# Patient Record
Sex: MALE | Race: WHITE | NOT HISPANIC OR LATINO
[De-identification: names, ages, dates, MRNs, and addresses within clinical notes are randomized per-mention and may not be internally consistent; named-entity substitution may affect disease eponyms.]

---

## 2020-12-28 ENCOUNTER — TRANSCRIPTION ENCOUNTER (OUTPATIENT)
Age: 65
End: 2020-12-28

## 2021-03-16 ENCOUNTER — APPOINTMENT (OUTPATIENT)
Dept: PULMONOLOGY | Facility: CLINIC | Age: 66
End: 2021-03-16
Payer: MEDICARE

## 2021-03-16 VITALS
RESPIRATION RATE: 16 BRPM | BODY MASS INDEX: 22.22 KG/M2 | WEIGHT: 150 LBS | HEART RATE: 76 BPM | SYSTOLIC BLOOD PRESSURE: 152 MMHG | TEMPERATURE: 98.5 F | HEIGHT: 69 IN | DIASTOLIC BLOOD PRESSURE: 84 MMHG

## 2021-03-16 DIAGNOSIS — Z80.0 FAMILY HISTORY OF MALIGNANT NEOPLASM OF DIGESTIVE ORGANS: ICD-10-CM

## 2021-03-16 DIAGNOSIS — Z82.49 FAMILY HISTORY OF ISCHEMIC HEART DISEASE AND OTHER DISEASES OF THE CIRCULATORY SYSTEM: ICD-10-CM

## 2021-03-16 DIAGNOSIS — Z86.59 PERSONAL HISTORY OF OTHER MENTAL AND BEHAVIORAL DISORDERS: ICD-10-CM

## 2021-03-16 DIAGNOSIS — F17.200 NICOTINE DEPENDENCE, UNSPECIFIED, UNCOMPLICATED: ICD-10-CM

## 2021-03-16 DIAGNOSIS — R01.1 CARDIAC MURMUR, UNSPECIFIED: ICD-10-CM

## 2021-03-16 DIAGNOSIS — F41.9 ANXIETY DISORDER, UNSPECIFIED: ICD-10-CM

## 2021-03-16 PROCEDURE — 99204 OFFICE O/P NEW MOD 45 MIN: CPT

## 2021-03-16 RX ORDER — GABAPENTIN 300 MG/1
300 CAPSULE ORAL
Refills: 0 | Status: ACTIVE | COMMUNITY
Start: 2021-03-16

## 2021-03-16 RX ORDER — OMEPRAZOLE 40 MG/1
40 CAPSULE, DELAYED RELEASE ORAL
Refills: 0 | Status: ACTIVE | COMMUNITY
Start: 2021-03-16

## 2021-03-16 NOTE — REVIEW OF SYSTEMS
[Cough] : cough [Sputum] : sputum [Wheezing] : wheezing [SOB on Exertion] : sob on exertion [Negative] : Allergy/Immunology [Nasal Congestion] : nasal congestion [Postnasal Drip] : postnasal drip [Dysphagia] : dysphagia [Arthralgias] : arthralgias [Back Pain] : back pain [Anxiety] : anxiety

## 2021-03-16 NOTE — REASON FOR VISIT
[Initial] : an initial visit [Abnormal CXR/ Chest CT] : an abnormal CXR/ chest CT [Emphysema] : emphysema [Bronchiectasis] : bronchiectasis

## 2021-03-19 LAB — BACTERIA SPT CULT: NORMAL

## 2021-03-26 ENCOUNTER — NON-APPOINTMENT (OUTPATIENT)
Age: 66
End: 2021-03-26

## 2021-03-26 LAB — BACTERIA SPT CULT: ABNORMAL

## 2021-03-29 LAB — BACTERIA SPT CULT: ABNORMAL

## 2021-03-30 ENCOUNTER — TRANSCRIPTION ENCOUNTER (OUTPATIENT)
Age: 66
End: 2021-03-30

## 2021-05-17 LAB
ACID FAST STN SPT: NORMAL

## 2021-06-24 ENCOUNTER — APPOINTMENT (OUTPATIENT)
Dept: PULMONOLOGY | Facility: CLINIC | Age: 66
End: 2021-06-24
Payer: MEDICARE

## 2021-06-24 VITALS
TEMPERATURE: 97.8 F | WEIGHT: 146 LBS | HEART RATE: 79 BPM | HEIGHT: 69 IN | SYSTOLIC BLOOD PRESSURE: 133 MMHG | DIASTOLIC BLOOD PRESSURE: 86 MMHG | RESPIRATION RATE: 16 BRPM | BODY MASS INDEX: 21.62 KG/M2

## 2021-06-24 DIAGNOSIS — Z86.19 PERSONAL HISTORY OF OTHER INFECTIOUS AND PARASITIC DISEASES: ICD-10-CM

## 2021-06-24 PROCEDURE — 99214 OFFICE O/P EST MOD 30 MIN: CPT

## 2021-06-24 NOTE — PHYSICAL EXAM
[No Acute Distress] : no acute distress [Normal Appearance] : normal appearance [Normal Rate/Rhythm] : normal rate/rhythm [No Resp Distress] : no resp distress [Clear to Auscultation Bilaterally] : clear to auscultation bilaterally [No Clubbing] : no clubbing [No Edema] : no edema [No Focal Deficits] : no focal deficits [Oriented x3] : oriented x3 [Normal Affect] : normal affect [TextBox_54] : 3/6 regurgitant murmur left sternal border; 1/6 systolic ejection murmur aortic area

## 2021-06-24 NOTE — REVIEW OF SYSTEMS
[Fatigue] : fatigue [Poor Appetite] : poor appetite [Nasal Congestion] : nasal congestion [Postnasal Drip] : postnasal drip [Cough] : cough [Chest Tightness] : chest tightness [Sputum] : sputum [Dyspnea] : dyspnea [SOB on Exertion] : sob on exertion [Negative] : Cardiovascular [Dysphagia] : dysphagia [Hepatic Disease] : hepatic disease [Chronic Pain] : chronic pain

## 2021-06-24 NOTE — HISTORY OF PRESENT ILLNESS
[TextBox_4] : 65 year old male active smoker with shortness of breath and known emphysema . 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 The patient was prescribed triple therapy but stopped almost immediately because of intolerance. The patient has significant asbestos exposure (he is an ) and is a current smoker with a history of greater than 30 pack years. He has a daily productive cough but denies hemoptysis\par He feels short of breath walking very short distances from his kitchen to the den.  He also feels very  fatigued when he is experiencing the dyspnea.  He finds that he wheezes while lying down as well.  \par He also admits to significant postnasal drip and dysphagia. Occasionally when he coughs, there is food particles in his sputum. He has difficulties with rice and nuts. He reports that he had a swallowing study a number of years ago and was told it was normal. He also has undergone endoscopy as well as colonoscopy. He also reports anxiety

## 2021-06-24 NOTE — PHYSICAL EXAM
[No Acute Distress] : no acute distress [Murmur ___ / 6] : murmur [unfilled] / 6 [Normal S1, S2] : normal s1, s2 [No Resp Distress] : no resp distress [Clear to Auscultation Bilaterally] : clear to auscultation bilaterally [No Focal Deficits] : no focal deficits [Oriented x3] : oriented x3 [Normal Affect] : normal affect [TextBox_54] : 3/6 systolic regurgitant murmur left sternal border to apex; 2/6 systolic ejection murmur aortic area

## 2021-06-24 NOTE — ASSESSMENT
[FreeTextEntry1] : 65 year old male active smoker with significant asbestos exposure (he is an ) with multiple nodules and RUL fissural band-like consolidation, cultures were positive for MAC (not currently on meds was not able to tolerate, emphysema with dyspnea on exertion and cough.\par . The patient had lung function studies from his previous pulmonary physician which demonstrated moderately severe obstructive airways disease with a concomitant diffusion defect. A PET scan done in the last 24 hours showed a decrease in size in the right lower lobe spiculated nodule and a new right upper lobe subcentimeter nodule with increased hypermetabolism, both suggestive of an inflammatory process. There are tubular densities particularly in the right upper lobe with calcification within the densities suggesting the possibility of a plastic bronchitis. A fluctuating  inflammatory processes also suggests the possibility of recurrent aspiration. The patient definitely describes dysphagia and this would also be consistent with a finding of Mycobacterium avium in his sputum.\par Currently he is being investigated for cardiac issues based on the finding of significant murmurs and his chest discomfort. I collected sputum for routine and acid fast organisms and told him that after his cardiac workup he would be looking at his swallowing as well as an esophagogram.\par The patient continues to smoke and is maintained on hydrocodone.\par \par \par Notes sent to Nicholas Hodgson MD at [facility]

## 2021-06-24 NOTE — HISTORY OF PRESENT ILLNESS
[TextBox_4] : 65 year old male active smoker with shortness of breath and known emphysema . 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 The patient was prescribed triple therapy but stopped almost immediately because of intolerance. The patient has significant asbestos exposure (he is an ) and is a current smoker with a history of greater than 30 pack years. He has a daily productive cough but denies hemoptysis\par He feels short of breath walking very short distances from his kitchen to the den.  He also feels very  fatigued when he is experiencing the dyspnea. In addition to anxiety, his major complaint is fatigue and weakness.\par He also admits to significant postnasal drip and dysphagia. Occasionally when he coughs, there is food particles in his sputum. He has difficulties with rice and nuts. He reports that he had a swallowing study a number of years ago and was told it was normal. He also has undergone endoscopy as well as colonoscopy. He also reports anxiety\par He had a recent angiogram that was normal.

## 2021-06-24 NOTE — END OF VISIT
[FreeTextEntry3] : I obtained the history and examined the patient and developed a plan of care  Nikunj Hallman MD\par

## 2021-06-24 NOTE — ASSESSMENT
[FreeTextEntry1] : 65 year old male active smoker with shortness of breath and known emphysema.  Pt was recently treated with Levaquin for serratia marcescens in sputum culture but felt no different on therapy. He recently underwent a cardiac evaluation with Dr. Caldwell and was told that everything was okay including a coronary angiogram. because of persistent dysphagia, he will need to have an esophagram and modified barium swallow.  \par \stuart I will send this information along to his primary physician Dr. Frank Amico and to his cardiologist Dr. Ann Caldwell

## 2021-07-15 ENCOUNTER — NON-APPOINTMENT (OUTPATIENT)
Age: 66
End: 2021-07-15

## 2021-08-13 ENCOUNTER — OUTPATIENT (OUTPATIENT)
Dept: OUTPATIENT SERVICES | Facility: HOSPITAL | Age: 66
LOS: 1 days | Discharge: HOME | End: 2021-08-13

## 2021-08-13 ENCOUNTER — APPOINTMENT (OUTPATIENT)
Dept: RADIOLOGY | Facility: HOSPITAL | Age: 66
End: 2021-08-13
Payer: MEDICARE

## 2021-08-13 ENCOUNTER — APPOINTMENT (OUTPATIENT)
Dept: SPEECH THERAPY | Facility: HOSPITAL | Age: 66
End: 2021-08-13
Payer: MEDICARE

## 2021-08-13 ENCOUNTER — OUTPATIENT (OUTPATIENT)
Dept: OUTPATIENT SERVICES | Facility: HOSPITAL | Age: 66
LOS: 1 days | End: 2021-08-13

## 2021-08-13 DIAGNOSIS — R13.0 APHAGIA: ICD-10-CM

## 2021-08-13 PROCEDURE — 74220 X-RAY XM ESOPHAGUS 1CNTRST: CPT | Mod: 26

## 2021-08-13 PROCEDURE — 74230 X-RAY XM SWLNG FUNCJ C+: CPT | Mod: 26

## 2021-08-13 NOTE — ASSESSMENT
[FreeTextEntry1] :                              MODIFIED BARIUM SWALLOW STUDY\par \par Date of Report: 8/13/2021\par Date of Evaluation: 8/13/2021\par Patient Name: Marco Chen\par YOB: 1955\par Primary Diagnosis: OroPharyngeal Dysphagia\par Treatment Diagnosis:  OroPharyngeal Dysphagia\par Referring Physician:  Dr. Hallman \par \par Impressions/Results: \par 1.	There is No Laryngeal Penetration of Aspiration before, during or after the swallow with puree and solids.  \par 2.	There is Intermittent Laryngeal Penetration during the swallow with thin liquids and nectar thick liquids with complete retrieval maintaining adequate airway protection.\par \par Reason For Referral: This 65 year old male was seen for a Modified Barium Swallow to:  rule out aspiration and assess for safest diet consistency.  To determine patient's ability to safely tolerate an oral diet.  The physician ordered this procedure because he wants the patient to meet their nutrition/hydration needs by mouth without compromising respiratory status.  \par \par Patient reports that he recently has had dental work done which has impacted his chewing abilities.  Patient reports that he experiences coughing after eating which results in “food coming back up and into my nose with a lot of phlegm.”  Patient reports this does not happen every time he eats but it happens often. Patient denies any recent pneumonias.  Patient reports losing ~5lbs over the past year when the difficulty began.  \par \par Current Nutritional Intake: Soft solids and thin liquids as per patient.  \par \par Medical History: As per chart review\par Active Problems\par Acquired bronchiectasis (494.0) (J47.9)\par Anxiety (300.00) (F41.9)\par COPD (chronic obstructive pulmonary disease) (496) (J44.9)\par Heart murmur (785.2) (R01.1)\par Hepatitis C (070.70) (B19.20)\par \par Past Medical History\par History of anxiety (V11.8) (Z86.59)\par History of hepatitis C (V12.09) (Z86.19)\par \par ASSESSMENT\par The patient was assessed seated in the lateral plane in the [facility], with Radiologist present.  The patient was alert, cooperative.  Secretion management was adequate.  There was no coughing, throat clearing or wet/gurgly vocal quality prior to test administration. \par \par Consistencies Administered:  \par Solids:  solids, puree\par Liquids:  nectar thick liquids, thin liquids \par \par SUMMARY & IMPRESSION\par The patient demonstrated the following:\par Video Fluoroscopic Evaluation reveals:\par Patient presents with Functional Oral Phase and Mild Pharyngeal Dysphagia.  \par Oral phase characterized by adequate acceptance, adequate anterior bolus containment, functional mastication and manipulation, adequate tongue to palate seal, adequate anterior to posterior transport and adequate oral cavity clearance.  Pharyngeal phase characterized by delayed pharyngeal swallow initiating at the level of the lateral surface of the epiglottis. There is reduced base of tongue retraction, adequate hyolaryngeal elevation and excursion, adequate pharyngeal contraction and adequate laryngeal vestibule closure.  There is mild vallecula residue with solids which clears with a liquid wash.  There is No Laryngeal Penetration of Aspiration before, during or after the swallow with puree and solids.  There is Intermittent Laryngeal Penetration during the swallow with thin liquids and nectar thick liquids with complete retrieval maintaining adequate airway protection.  Small Sips eliminates Laryngeal Penetration.   \par \par Of note, patient had an esophagram following this evaluation, please refer to radiologist report for full details.  \par \par Aspiration - Penetration Scale: \par PUREE: 1\par SOLIDS: 1\par NECTAR THICK LIQUIDS: 1&2\par THIN LIQUIDS: 1&2\par \par Aspiration - Penetration Scale    (Angel et al Dysphagia 11:93-98 (April 1996), Aspiration-Penetration Scale)\par 1.    Material does not enter the airway\par 2.    Material enters the airway, remains above the vocal folds, and is ejected from the airway\par 3.    Material enters the airway, remains above the vocal folds, and is not ejected\par 4.    Material enters the airway, contacts the vocal folds, and is ejected from the airway\par 5.    Material enters the airway, contacts the vocal folds, and is not ejected from the airway\par 6.    Material enters the airway, passes below the vocal folds and is ejected into the larynx or out of the airway\par 7.    Material enters the airway, passes below the vocal folds, and is not ejected from the trachea despite effort\par 8.    Material enters the airway, passes below the vocal folds, and no effort is made to eject\par \par Recommendations:\par 1.	Soft solids and Thin Liquids with Small Sips \par 2.	Aspiration precautions\par 3.	Safe Swallow Strategies: Upright position during PO intake and for 30 minutes following, alternate solids and liquids, Small Sips\par 4.	Follow up with your referring physician \par \par The above results and recommendations have been discussed with the patient. All questions were answered with patient demonstrating good understanding. \par \par Should you have any additional concerns, please contact the Center at [phone].\par \par Allison Dreyer MA, CCC-SLP\par Speech-Language Pathologist \par [facility]\par \par \par \par \par

## 2021-08-25 ENCOUNTER — NON-APPOINTMENT (OUTPATIENT)
Age: 66
End: 2021-08-25

## 2021-09-01 DIAGNOSIS — R13.13 DYSPHAGIA, PHARYNGEAL PHASE: ICD-10-CM

## 2022-01-09 ENCOUNTER — NON-APPOINTMENT (OUTPATIENT)
Age: 67
End: 2022-01-09

## 2022-01-10 ENCOUNTER — NON-APPOINTMENT (OUTPATIENT)
Age: 67
End: 2022-01-10

## 2022-01-10 DIAGNOSIS — G47.00 INSOMNIA, UNSPECIFIED: ICD-10-CM

## 2022-01-11 PROBLEM — G47.00 INSOMNIA: Status: ACTIVE | Noted: 2022-01-11

## 2022-01-27 ENCOUNTER — OUTPATIENT (OUTPATIENT)
Dept: OUTPATIENT SERVICES | Facility: HOSPITAL | Age: 67
LOS: 1 days | End: 2022-01-27
Payer: MEDICARE

## 2022-01-27 ENCOUNTER — APPOINTMENT (OUTPATIENT)
Dept: CT IMAGING | Facility: CLINIC | Age: 67
End: 2022-01-27
Payer: MEDICARE

## 2022-01-27 DIAGNOSIS — J47.9 BRONCHIECTASIS, UNCOMPLICATED: ICD-10-CM

## 2022-01-27 PROCEDURE — 71250 CT THORAX DX C-: CPT | Mod: 26,ME

## 2022-01-27 PROCEDURE — 71250 CT THORAX DX C-: CPT | Mod: ME

## 2022-01-27 PROCEDURE — G1004: CPT

## 2022-02-04 ENCOUNTER — LABORATORY RESULT (OUTPATIENT)
Age: 67
End: 2022-02-04

## 2022-02-04 ENCOUNTER — APPOINTMENT (OUTPATIENT)
Dept: PULMONOLOGY | Facility: CLINIC | Age: 67
End: 2022-02-04
Payer: MEDICARE

## 2022-02-04 VITALS
BODY MASS INDEX: 20.44 KG/M2 | SYSTOLIC BLOOD PRESSURE: 129 MMHG | HEART RATE: 105 BPM | TEMPERATURE: 94.5 F | WEIGHT: 138 LBS | OXYGEN SATURATION: 96 % | HEIGHT: 69 IN | DIASTOLIC BLOOD PRESSURE: 82 MMHG

## 2022-02-04 LAB
ALBUMIN SERPL ELPH-MCNC: 3.7 G/DL
ALP BLD-CCNC: 102 U/L
ALT SERPL-CCNC: 22 U/L
ANION GAP SERPL CALC-SCNC: 13 MMOL/L
AST SERPL-CCNC: 36 U/L
BASOPHILS # BLD AUTO: 0.04 K/UL
BASOPHILS NFR BLD AUTO: 0.3 %
BILIRUB SERPL-MCNC: 0.8 MG/DL
BUN SERPL-MCNC: 23 MG/DL
CALCIUM SERPL-MCNC: 9.4 MG/DL
CHLORIDE SERPL-SCNC: 100 MMOL/L
CO2 SERPL-SCNC: 25 MMOL/L
CREAT SERPL-MCNC: 0.79 MG/DL
EOSINOPHIL # BLD AUTO: 0.14 K/UL
EOSINOPHIL NFR BLD AUTO: 1.2 %
HCT VFR BLD CALC: 43.8 %
HGB BLD-MCNC: 14.5 G/DL
IMM GRANULOCYTES NFR BLD AUTO: 0.6 %
LYMPHOCYTES # BLD AUTO: 1.2 K/UL
LYMPHOCYTES NFR BLD AUTO: 10.2 %
MAN DIFF?: NORMAL
MCHC RBC-ENTMCNC: 30.8 PG
MCHC RBC-ENTMCNC: 33.1 GM/DL
MCV RBC AUTO: 93 FL
MONOCYTES # BLD AUTO: 1.24 K/UL
MONOCYTES NFR BLD AUTO: 10.6 %
NEUTROPHILS # BLD AUTO: 9.04 K/UL
NEUTROPHILS NFR BLD AUTO: 77.1 %
PLATELET # BLD AUTO: 264 K/UL
POTASSIUM SERPL-SCNC: 4.2 MMOL/L
PROT SERPL-MCNC: 7.2 G/DL
RBC # BLD: 4.71 M/UL
RBC # FLD: 11.6 %
SODIUM SERPL-SCNC: 137 MMOL/L
WBC # FLD AUTO: 11.73 K/UL

## 2022-02-04 PROCEDURE — 99214 OFFICE O/P EST MOD 30 MIN: CPT

## 2022-02-04 NOTE — PHYSICAL EXAM
[Normal Appearance] : normal appearance [No Neck Mass] : no neck mass [Normal Rate/Rhythm] : normal rate/rhythm [No Edema] : no edema [Oriented x3] : oriented x3 [TextBox_2] : in some distress complaining of left chest and left shoulder pain [TextBox_54] : 3/6 systolic regurgitant murmur left sternal border to apex [TextBox_68] : crackles left upper lobe

## 2022-02-04 NOTE — REVIEW OF SYSTEMS
[Fatigue] : fatigue [Sinus Problems] : sinus problems [SOB on Exertion] : sob on exertion [Chest Discomfort] : chest discomfort [Dysphagia] : dysphagia

## 2022-02-04 NOTE — ASSESSMENT
[FreeTextEntry1] : the patient's chest CT clearly shows a new infiltrate in his left upper lobe. I collected sputum for culture and we did blood work today. I restarted him on high dose Levaquin based on my sputum culture from last year. He will followup with me in 3 days. Once we clear this, I should investigate for swallowing issues again as well as his immunologic status. If in fact this doesn't clear so quickly, we may need to reinvestigate for Mycobacterium avium.

## 2022-02-04 NOTE — END OF VISIT
[FreeTextEntry3] : I spent 30 minutes in the sink counter including face-to-face time, review of prior imaging and lengthy discussions with the patient about his condition and communicating to him our availability.

## 2022-02-04 NOTE — HISTORY OF PRESENT ILLNESS
[TextBox_4] : 66-year-old male with complex pulmonary history. He is a former smoker with advanced emphysema. In addition he has chronic changes in his right upper lobe suggesting bronchiectasis. On at least one occasion Mycobacterium avium has been recovered and he was started on therapy but was intolerant. He has had evanescent pulmonary infiltrates and with the presence of dysphagia, there was a possibility of recurrent aspiration, yet his swallowing study was unrevealing. Earlier in January he complained of a sinus infection and he was given one week of Levaquin with improvement in his cough. However the cough soon returned and was accompanied by copious amounts of sputum production. He is complaining of left upper chest discomfort and a CAT scan done last week demonstrated a new infiltrate in his left upper lobe. He thought it was related to his left shoulder and he had a subsequent steroid injection but the pain has persisted. His latest sputum cultures which were done a year ago had recurrent Serratia. Aspiration continues to remain a strong possibility.

## 2022-02-07 ENCOUNTER — NON-APPOINTMENT (OUTPATIENT)
Age: 67
End: 2022-02-07

## 2022-02-07 LAB — BACTERIA SPT CULT: NORMAL

## 2022-02-08 ENCOUNTER — OUTPATIENT (OUTPATIENT)
Dept: OUTPATIENT SERVICES | Facility: HOSPITAL | Age: 67
LOS: 1 days | End: 2022-02-08
Payer: MEDICARE

## 2022-02-08 ENCOUNTER — APPOINTMENT (OUTPATIENT)
Dept: RADIOLOGY | Facility: CLINIC | Age: 67
End: 2022-02-08
Payer: MEDICARE

## 2022-02-08 DIAGNOSIS — J47.9 BRONCHIECTASIS, UNCOMPLICATED: ICD-10-CM

## 2022-02-08 DIAGNOSIS — Z87.01 PERSONAL HISTORY OF PNEUMONIA (RECURRENT): ICD-10-CM

## 2022-02-08 PROCEDURE — 71046 X-RAY EXAM CHEST 2 VIEWS: CPT | Mod: 26

## 2022-02-08 PROCEDURE — 71046 X-RAY EXAM CHEST 2 VIEWS: CPT

## 2022-02-09 ENCOUNTER — APPOINTMENT (OUTPATIENT)
Dept: PULMONOLOGY | Facility: CLINIC | Age: 67
End: 2022-02-09

## 2022-02-09 ENCOUNTER — NON-APPOINTMENT (OUTPATIENT)
Age: 67
End: 2022-02-09

## 2022-02-10 ENCOUNTER — NON-APPOINTMENT (OUTPATIENT)
Age: 67
End: 2022-02-10

## 2022-02-22 ENCOUNTER — APPOINTMENT (OUTPATIENT)
Dept: PULMONOLOGY | Facility: CLINIC | Age: 67
End: 2022-02-22
Payer: MEDICARE

## 2022-02-22 VITALS
WEIGHT: 134 LBS | HEART RATE: 81 BPM | RESPIRATION RATE: 15 BRPM | HEIGHT: 69 IN | OXYGEN SATURATION: 96 % | SYSTOLIC BLOOD PRESSURE: 126 MMHG | TEMPERATURE: 94 F | DIASTOLIC BLOOD PRESSURE: 70 MMHG | BODY MASS INDEX: 19.85 KG/M2

## 2022-02-22 DIAGNOSIS — R13.10 DYSPHAGIA, UNSPECIFIED: ICD-10-CM

## 2022-02-22 PROCEDURE — 99214 OFFICE O/P EST MOD 30 MIN: CPT

## 2022-02-22 NOTE — HISTORY OF PRESENT ILLNESS
[TextBox_4] : 66-year-old male, former smoker, advanced emphysema and chronic bandlike changes in his right upper lobe suggesting bronchiectasis. In addition he has underlying hepatitis C and is a current smoker with the specimens exposure. He continues to have complaints of dysphagia and yet his swallowing study was normal. He has had a history of evanescent pneumonia suggesting aspiration and the finding on bronchoscopy of Mycobacterium avium a number of years ago. He did not tolerate treatment and stopped his medication after only several days. He continues to have a productive cough and complains of left chest pain and shoulder pain. He reports to me that he likely needs left shoulder replacement and he has had steroid injections in that shoulder. Recently he was found to have a new left upper lobe infiltrate on a background of emphysema. He was intolerant of high dose Levaquin but didn't tolerate a complete course of Levaquin. He continues to complain of left chest discomfort and copious sputum and when switched to Augmentin, he couldn't tolerate it. His routine sputum cultures were negative but he recently got her out Mycobacterium avium again from his sputum.\par . For his chest pain he ingests a great deal of acetaminophen, almost 2 g per day.

## 2022-02-22 NOTE — REVIEW OF SYSTEMS
[Cough] : cough [Sputum] : sputum [SOB on Exertion] : sob on exertion [Hepatic Disease] : hepatic disease [Arthralgias] : arthralgias [Negative] : Cardiovascular

## 2022-02-22 NOTE — PHYSICAL EXAM
[No Acute Distress] : no acute distress [Normal Appearance] : normal appearance [Normal Rate/Rhythm] : normal rate/rhythm [Normal S1, S2] : normal s1, s2 [Normal Gait] : normal gait [No Focal Deficits] : no focal deficits [TextBox_68] : decreased breath sounds left upper chest [TextBox_105] : mild clubbing

## 2022-02-22 NOTE — ASSESSMENT
[FreeTextEntry1] : . He clearly has a new infiltrate in his left upper lobe. He remains afebrile. Although the new infiltrate could represent Mycobacterium avium, it happened fairly quickly. I continue to be concerned about his swallowing issues and whether or  some of these infiltrates are related to aspiration. We discussed the treatment for Mycobacterium avium but elected to wait an additional 2 weeks to see if he develops clearing of that left upper lobe infiltrate. Should he not, i I'd be inclined to begin treatment.\par We also discussed his use of excessive Tylenol. I believe his left chest pain is related to his shoulder which apparently will need replacement.\par \par \par Copy to Nicholas Hodgson MD

## 2022-02-23 ENCOUNTER — OUTPATIENT (OUTPATIENT)
Dept: OUTPATIENT SERVICES | Facility: HOSPITAL | Age: 67
LOS: 1 days | End: 2022-02-23
Payer: MEDICARE

## 2022-02-23 ENCOUNTER — APPOINTMENT (OUTPATIENT)
Dept: RADIOLOGY | Facility: CLINIC | Age: 67
End: 2022-02-23
Payer: MEDICARE

## 2022-02-23 DIAGNOSIS — Z87.01 PERSONAL HISTORY OF PNEUMONIA (RECURRENT): ICD-10-CM

## 2022-02-23 DIAGNOSIS — J47.9 BRONCHIECTASIS, UNCOMPLICATED: ICD-10-CM

## 2022-02-23 PROCEDURE — 71046 X-RAY EXAM CHEST 2 VIEWS: CPT

## 2022-02-23 PROCEDURE — 71046 X-RAY EXAM CHEST 2 VIEWS: CPT | Mod: 26

## 2022-02-24 RX ORDER — LEVOFLOXACIN 750 MG/1
750 TABLET, FILM COATED ORAL DAILY
Qty: 14 | Refills: 1 | Status: DISCONTINUED | COMMUNITY
Start: 2022-02-04 | End: 2022-02-24

## 2022-02-28 LAB — BACTERIA SPT CULT: NORMAL

## 2022-03-07 ENCOUNTER — APPOINTMENT (OUTPATIENT)
Dept: INFECTIOUS DISEASE | Facility: CLINIC | Age: 67
End: 2022-03-07
Payer: MEDICARE

## 2022-03-07 VITALS
OXYGEN SATURATION: 95 % | DIASTOLIC BLOOD PRESSURE: 82 MMHG | HEART RATE: 84 BPM | SYSTOLIC BLOOD PRESSURE: 141 MMHG | TEMPERATURE: 98.7 F | WEIGHT: 135 LBS | BODY MASS INDEX: 19.99 KG/M2 | HEIGHT: 69 IN | RESPIRATION RATE: 15 BRPM

## 2022-03-07 PROCEDURE — 99204 OFFICE O/P NEW MOD 45 MIN: CPT

## 2022-03-07 NOTE — REVIEW OF SYSTEMS
[Recent Weight Loss (___ Lbs)] : recent [unfilled] ~Ulb weight loss [Cough] : cough [Sputum] : coughing up ~M sputum [Joint Pain] : joint pain [Negative] : Heme/Lymph

## 2022-03-07 NOTE — ASSESSMENT
[FreeTextEntry1] : This is a 66 M, smoker, advanced emphysema, abnormal CT chest suggestive of bronchiectasis RUL, Hepatitis C, heart murmur, back surgery, chronic pain on long standing opiates, presents today for MAC and new changes on CT in Left Uppler Lobe of  lung. \par \par 1) MAC:\par Long standing infection since 2013 based on history.\par I do not have prior culture data from outside hospitals.\par Never treated before.\par Agree pt does meet criteria for treatment and with positive cultures and changes on CT chest, is reasonable to attempt treatment.\par I am concerned that pt will not be able to tolerate due to intolerability and drug interactions as well.\par Agree with starting 1 drug at a time\par Today 3/7/22: Start azithro 500 mg po Mon/Wed/Fri\par If can tolerate, can add Ethambutol 1200 mg po Mon/Wed/Fri next week.\par Not sure rifampin is a good idea with his hydrocodone - can lower levels.\par May need to use inhaled amikacin if isolate is sensitive.  May keep on 2 drugs while awaiting susceptibility testing.\par Requested sensi testing today.\par \par 2) Bronchiectasis:\par Should do good mucus clearing as per pulmonary\par \par 3) Hep C:\par Will check labs next visit.\par CBC and CMP okay 2/4/22.  \par I may refer him to hepatology depending on lab results\par \par 4) Side effects:\par Discuss side effects with pt and hiw wife at length.\par Given literature to review.\par Advised to see ophthal now and again every 4 months while on ethambutol.\par No alcohol while on therapy\par \par Pt to get close f/up and return in 3 weeks.\par \par

## 2022-03-07 NOTE — PHYSICAL EXAM
[General Appearance - Alert] : alert [General Appearance - In No Acute Distress] : in no acute distress [Sclera] : the sclera and conjunctiva were normal [PERRL With Normal Accommodation] : pupils were equal in size, round, reactive to light [Outer Ear] : the ears and nose were normal in appearance [Neck Appearance] : the appearance of the neck was normal [Exaggerated Use Of Accessory Muscles For Inspiration] : no accessory muscle use [Heart Rate And Rhythm] : heart rate was normal and rhythm regular [Heart Sounds] : normal S1 and S2 [FreeTextEntry1] : +loud murmur [Skin Color & Pigmentation] : normal skin color and pigmentation [] : no rash [No Focal Deficits] : no focal deficits [Oriented To Time, Place, And Person] : oriented to person, place, and time [Affect] : the affect was normal

## 2022-03-07 NOTE — HISTORY OF PRESENT ILLNESS
[FreeTextEntry1] : This is a 66 M, smoker, advanced emphysema, abnormal CT chest suggestive of bronchiectasis RUL, Hepatitis C, heart murmur, back surgery, chronic pain on long standing opiates, presents today for MAC and new changes on CT in Left Uppler Lobe of  lung. \par \par He reports cough, produces phlegm.\par Losing weight. \par Feels very fatigued.\par Reports swallowing issues and w/up is pending as per pulmonary.\par Denies fevers. \par \par Recently when returned from [place] was sick. Given levaquin and augmentin. Not able to tolerate levaquin. \par \par Reports that he was initially found to have MAC in 2013. Treated at [facility]. Given mac meds and after 1 day had to stop them. He could not tolerate he says. Since was seen at [facility] for rule out malignancy and saw ID there.  Ws never treated again for MAC but was followed.  Not able to f/up there anymore as he does not have h/o malignancy.\par \par Willing to try MAC meds, though anxious about it.\par Presents with wife today.\par \par Works as \par Long time smoker.\par 2 kids\par 4 grandchildren - live in [place].\par Never treated for Hep C\par Labs from 2/2022 have normal LFTs.

## 2022-03-07 NOTE — CONSULT LETTER
[Dear  ___] : Dear  [unfilled], [Consult Letter:] : I had the pleasure of evaluating your patient, [unfilled]. [Please see my note below.] : Please see my note below. [Consult Closing:] : Thank you very much for allowing me to participate in the care of this patient.  If you have any questions, please do not hesitate to contact me. [Sincerely,] : Sincerely, [FreeTextEntry2] : Dr. Nikunj Hallman [FreeTextEntry3] : \par Josephine Steve MD\par  of Medicine\par Division of Infectious Diseases\par [facility] [employer] [facility] [employer] [facility]\par [address] [place] [address] DrKris\[place] [address]\par Tel: [phone]\par Fax: [phone] [DrKris  ___] : Dr. MONTAGUE

## 2022-03-28 ENCOUNTER — APPOINTMENT (OUTPATIENT)
Dept: INFECTIOUS DISEASE | Facility: CLINIC | Age: 67
End: 2022-03-28
Payer: MEDICARE

## 2022-03-28 VITALS
SYSTOLIC BLOOD PRESSURE: 136 MMHG | OXYGEN SATURATION: 98 % | RESPIRATION RATE: 15 BRPM | WEIGHT: 137 LBS | HEIGHT: 69 IN | HEART RATE: 9 BPM | DIASTOLIC BLOOD PRESSURE: 85 MMHG | TEMPERATURE: 98.5 F | BODY MASS INDEX: 20.29 KG/M2

## 2022-03-28 PROCEDURE — 99214 OFFICE O/P EST MOD 30 MIN: CPT

## 2022-04-01 NOTE — ASSESSMENT
[FreeTextEntry1] : This is a 66 M, smoker, advanced emphysema, abnormal CT chest suggestive of bronchiectasis RUL, Hepatitis C, heart murmur, back surgery, chronic pain on long standing opiates, presents today for MAC and new changes on CT in Left Uppler Lobe of  lung. \par \par 1) MAC:\par Long standing infection since 2013 based on history.\par I do not have prior culture data from outside hospitals.\par Never treated before.\par Agree pt does meet criteria for treatment and with positive cultures and changes on CT chest, is reasonable to attempt treatment.\par I am concerned that pt will not be able to tolerate due to intolerability and drug interactions as well.\par Agree with starting 1 drug at a time.\par  3/7/22: Start azithro 500 mg po Mon/Wed/Fri. He was NOT able to tolerate this.  Did not try other meds as listed below.\par If can tolerate, can add Ethambutol 1200 mg po Mon/Wed/Fri next week.\par Not sure rifampin is a good idea with his hydrocodone - can lower levels.\par May need to use inhaled amikacin if isolate is sensitive.  May keep on 2 drugs while awaiting susceptibility testing.\par Requested sensi testing  and is pending.\par \par He is travelign to [place] and will return after his trip. Will try azithro 250 mg po MWF when he is back. \par \par 2) Bronchiectasis:\par Should do good mucus clearing as per pulmonary\par \par 3) Hep C:\par Advised f/up with hepatology. Referral given.\par CBC and CMP okay 2/4/22.  \par \par \par 4) Side effects:\par Discuss side effects with pt and hiw wife at length.\par Given literature to review.\par Advised to see ophthal now and again every 4 months while on ethambutol.\par No alcohol while on therapy\par \par Pt to get close f/up and return when back from his trip. \par \par \par

## 2022-04-01 NOTE — CONSULT LETTER
[Dear  ___] : Dear  [unfilled], [Consult Letter:] : I had the pleasure of evaluating your patient, [unfilled]. [Please see my note below.] : Please see my note below. [Consult Closing:] : Thank you very much for allowing me to participate in the care of this patient.  If you have any questions, please do not hesitate to contact me. [Sincerely,] : Sincerely, [FreeTextEntry2] : Dr. Nikunj Hallman [FreeTextEntry3] : \par Josephine Steve MD\par  of Medicine\par Division of Infectious Diseases\par [facility] [employer] [facility] at [facility]\par [address] [place] [address] DrKris\[place] [address]\par Tel: [phone]\par Fax: [phone] [DrKris  ___] : Dr. MONTAGUE

## 2022-04-01 NOTE — HISTORY OF PRESENT ILLNESS
[FreeTextEntry1] : This is a 66 M, smoker, advanced emphysema, abnormal CT chest suggestive of bronchiectasis RUL, Hepatitis C, heart murmur, back surgery, chronic pain on long standing opiates, presents today for MAC and new changes on CT in Left Uppler Lobe of  lung. \par \par He reports cough, produces phlegm.\par Losing weight. \par Feels very fatigued.\par Reports swallowing issues and w/up is pending as per pulmonary.\par Denies fevers. \par \par Recently when returned from [place] was sick. Given levaquin and augmentin. Not able to tolerate levaquin. \par \par Reports that he was initially found to have MAC in 2013. Treated at [place]. Given mac meds and after 1 day had to stop them. He could not tolerate he says. Since was seen at [facility] for rule out malignancy and saw ID there.  Ws never treated again for MAC but was followed.  Not able to f/up there anymore as he does not have h/o malignancy.\par \par Willing to try MAC meds, though anxious about it.\par Presents with wife today.\par \par Works as \par Long time smoker.\par 2 kids\par 4 grandchildren - live in [place].\par Never treated for Hep C\par Labs from 2/2022 have normal LFTs. \par \par 3/28/22 Visit:\par Since last visit tried azithromycin 500 mg on MWF x 1 week. By Friday felt very unwell and had to stop medication. Could not even try next med, which was planned to be ethambutol.  \par Pt reports he feels better now off of meds.\par Will be traveling to [place] and return early May. \par Also shows me labs from Hep C and VL was high over 4 million.

## 2022-04-04 ENCOUNTER — APPOINTMENT (OUTPATIENT)
Dept: HEPATOLOGY | Facility: CLINIC | Age: 67
End: 2022-04-04
Payer: MEDICARE

## 2022-04-04 VITALS
HEART RATE: 92 BPM | BODY MASS INDEX: 20.73 KG/M2 | WEIGHT: 140 LBS | DIASTOLIC BLOOD PRESSURE: 83 MMHG | SYSTOLIC BLOOD PRESSURE: 147 MMHG | HEIGHT: 69 IN

## 2022-04-04 DIAGNOSIS — R79.1 ABNORMAL COAGULATION PROFILE: ICD-10-CM

## 2022-04-04 PROCEDURE — 99204 OFFICE O/P NEW MOD 45 MIN: CPT

## 2022-04-04 RX ORDER — AMOXICILLIN AND CLAVULANATE POTASSIUM 875; 125 MG/1; MG/1
875-125 TABLET, COATED ORAL
Qty: 20 | Refills: 0 | Status: DISCONTINUED | COMMUNITY
Start: 2022-02-09 | End: 2022-04-04

## 2022-04-04 RX ORDER — HYDROCODONE BITARTRATE AND ACETAMINOPHEN 10; 325 MG/1; MG/1
10-325 TABLET ORAL
Qty: 180 | Refills: 0 | Status: DISCONTINUED | COMMUNITY
Start: 2022-03-08

## 2022-04-04 RX ORDER — DICLOFENAC SODIUM AND MISOPROSTOL 75; 200 MG/1; UG/1
75-0.2 TABLET, DELAYED RELEASE ORAL
Qty: 10 | Refills: 0 | Status: DISCONTINUED | COMMUNITY
Start: 2021-12-23

## 2022-04-04 NOTE — ASSESSMENT
[FreeTextEntry1] : Mr. Marco Chen 66 yoM with pmhx COPD, MAC, GERD, and chronic pain here for evaluation of hepatitis C. \par \par -Treatment naive. BW ordered to check genotype and prior exposure to hep B.\par - I recommended a fibroscan test to assess for fibrosis \par -Abdo US ordered to assess for liver lesions since never had one\par -Discussed with patient and wife hepatitis C treatment, potential side effects and SVR. \par \par Plan:\par BW, fibroscan test, Abdo US and F/U in 3 weeks

## 2022-04-04 NOTE — REASON FOR VISIT
[Initial Evaluation] : an initial evaluation [Spouse] : spouse [FreeTextEntry1] : Chronic Hepatitis C

## 2022-04-04 NOTE — REVIEW OF SYSTEMS
Needs ov,for next refill [Fever] : no fever [Chills] : no chills [Chest Pain] : no chest pain [Cough] : cough [Negative] : Genitourinary [FreeTextEntry9] : joint pain

## 2022-04-04 NOTE — PHYSICAL EXAM
[Scleral Icterus] : No Scleral Icterus [Abdominal  Ascites] : no ascites [Asterixis] : no asterixis observed [Jaundice] : No jaundice [Palmar Erythema] : no Palmar Erythema [General Appearance - Alert] : alert [General Appearance - In No Acute Distress] : in no acute distress [Sclera] : the sclera and conjunctiva were normal [Outer Ear] : the ears and nose were normal in appearance [Neck Appearance] : the appearance of the neck was normal [Neck Cervical Mass (___cm)] : no neck mass was observed [] : no respiratory distress [Respiration, Rhythm And Depth] : normal respiratory rhythm and effort [Exaggerated Use Of Accessory Muscles For Inspiration] : no accessory muscle use [Heart Rate And Rhythm] : heart rate was normal and rhythm regular [Heart Sounds] : normal S1 and S2 [FreeTextEntry1] : +murmur [Edema] : there was no peripheral edema [Bowel Sounds] : normal bowel sounds [Abdomen Soft] : soft [Abdomen Tenderness] : non-tender [Nail Clubbing] : no clubbing  or cyanosis of the fingernails [Skin Turgor] : normal skin turgor [Oriented To Time, Place, And Person] : oriented to person, place, and time

## 2022-04-04 NOTE — HISTORY OF PRESENT ILLNESS
[de-identified] : Mr. Marco Chen 66 yoM with pmhx COPD, MAC, GERD, and chronic pain here for evaluation of hepatitis C. He reports being diagnosed with hepatitis C in 2000 and was told at the time that he did not need treatment due to low viral counts.  Over the yrs, he was told that his liver was fine. He never had a liver biopsy or noninvasive testing to assess for fibrosis. Denies history of jaundice or decompensated liver disease in the past. \par \par He is currently taking Azithromycin 500 mg on MWF x 1 week for MAC. \par \par Never use IVD but did used cocaine for short period of time when he was young. Doesn't know if he had blood transfusion when he had surgery at age 17. He drinks alcohol socially usually with dinner but hasn't been socializing in the last 2 yrs. \par \par BW 2/4/22 ALT 22, AST 36, , Tbil 0.8, Albumin 3.7, plts 264,000\par BW 6/17/21 HCVRNA 4,350,000

## 2022-04-06 ENCOUNTER — APPOINTMENT (OUTPATIENT)
Dept: PULMONOLOGY | Facility: CLINIC | Age: 67
End: 2022-04-06

## 2022-04-07 ENCOUNTER — APPOINTMENT (OUTPATIENT)
Dept: HEPATOLOGY | Facility: CLINIC | Age: 67
End: 2022-04-07
Payer: MEDICARE

## 2022-04-07 LAB
AFP-TM SERPL-MCNC: 7.6 NG/ML
ALBUMIN SERPL ELPH-MCNC: 4 G/DL
ALP BLD-CCNC: 251 U/L
ALT SERPL-CCNC: 45 U/L
ANION GAP SERPL CALC-SCNC: 13 MMOL/L
AST SERPL-CCNC: 73 U/L
BASOPHILS # BLD AUTO: 0.06 K/UL
BASOPHILS NFR BLD AUTO: 0.7 %
BILIRUB SERPL-MCNC: 0.8 MG/DL
BUN SERPL-MCNC: 21 MG/DL
CALCIUM SERPL-MCNC: 9.2 MG/DL
CHLORIDE SERPL-SCNC: 102 MMOL/L
CO2 SERPL-SCNC: 25 MMOL/L
CREAT SERPL-MCNC: 0.74 MG/DL
EGFR: 99 ML/MIN/1.73M2
EOSINOPHIL # BLD AUTO: 0.38 K/UL
EOSINOPHIL NFR BLD AUTO: 4.4 %
HBV CORE IGG+IGM SER QL: REACTIVE
HBV SURFACE AB SER QL: NONREACTIVE
HBV SURFACE AG SER QL: NONREACTIVE
HCT VFR BLD CALC: 43.4 %
HCV RNA SERPL NAA DL=5-ACNC: NORMAL IU/ML
HCV RNA SERPL NAA+PROBE-LOG IU: 6.78 LOGIU/ML
HEPC RNA INTERP: DETECTED
HGB BLD-MCNC: 13.9 G/DL
IMM GRANULOCYTES NFR BLD AUTO: 0.3 %
LYMPHOCYTES # BLD AUTO: 1.96 K/UL
LYMPHOCYTES NFR BLD AUTO: 22.8 %
MAN DIFF?: NORMAL
MCHC RBC-ENTMCNC: 30 PG
MCHC RBC-ENTMCNC: 32 GM/DL
MCV RBC AUTO: 93.5 FL
MONOCYTES # BLD AUTO: 0.77 K/UL
MONOCYTES NFR BLD AUTO: 8.9 %
NEUTROPHILS # BLD AUTO: 5.41 K/UL
NEUTROPHILS NFR BLD AUTO: 62.9 %
PLATELET # BLD AUTO: 176 K/UL
POTASSIUM SERPL-SCNC: 4.4 MMOL/L
PROT SERPL-MCNC: 7.4 G/DL
RBC # BLD: 4.64 M/UL
RBC # FLD: 14.8 %
SODIUM SERPL-SCNC: 139 MMOL/L
WBC # FLD AUTO: 8.61 K/UL

## 2022-04-07 PROCEDURE — 91200 LIVER ELASTOGRAPHY: CPT

## 2022-04-09 ENCOUNTER — APPOINTMENT (OUTPATIENT)
Dept: ULTRASOUND IMAGING | Facility: CLINIC | Age: 67
End: 2022-04-09
Payer: MEDICARE

## 2022-04-09 ENCOUNTER — OUTPATIENT (OUTPATIENT)
Dept: OUTPATIENT SERVICES | Facility: HOSPITAL | Age: 67
LOS: 1 days | End: 2022-04-09
Payer: MEDICARE

## 2022-04-09 DIAGNOSIS — B19.20 UNSPECIFIED VIRAL HEPATITIS C WITHOUT HEPATIC COMA: ICD-10-CM

## 2022-04-09 PROCEDURE — 76700 US EXAM ABDOM COMPLETE: CPT

## 2022-04-09 PROCEDURE — 76700 US EXAM ABDOM COMPLETE: CPT | Mod: 26

## 2022-04-14 LAB
ACID FAST STN SPT: ABNORMAL
ACID FAST STN SPT: ABNORMAL
HCV GENTYP BLD NAA+PROBE: NORMAL

## 2022-05-05 ENCOUNTER — APPOINTMENT (OUTPATIENT)
Dept: HEPATOLOGY | Facility: CLINIC | Age: 67
End: 2022-05-05

## 2022-05-13 ENCOUNTER — NON-APPOINTMENT (OUTPATIENT)
Age: 67
End: 2022-05-13

## 2022-05-16 ENCOUNTER — APPOINTMENT (OUTPATIENT)
Dept: HEPATOLOGY | Facility: CLINIC | Age: 67
End: 2022-05-16
Payer: MEDICARE

## 2022-05-16 VITALS
RESPIRATION RATE: 16 BRPM | HEIGHT: 69 IN | WEIGHT: 140 LBS | DIASTOLIC BLOOD PRESSURE: 88 MMHG | SYSTOLIC BLOOD PRESSURE: 149 MMHG | BODY MASS INDEX: 20.73 KG/M2 | HEART RATE: 91 BPM

## 2022-05-16 DIAGNOSIS — K74.02 HEPATIC FIBROSIS, ADVANCED FIBROSIS: ICD-10-CM

## 2022-05-16 DIAGNOSIS — R76.8 OTHER SPECIFIED ABNORMAL IMMUNOLOGICAL FINDINGS IN SERUM: ICD-10-CM

## 2022-05-16 DIAGNOSIS — B19.20 UNSPECIFIED VIRAL HEPATITIS C W/OUT HEPATIC COMA: ICD-10-CM

## 2022-05-16 PROCEDURE — 99214 OFFICE O/P EST MOD 30 MIN: CPT

## 2022-05-16 RX ORDER — ALPRAZOLAM 0.25 MG/1
0.25 TABLET ORAL
Refills: 0 | Status: DISCONTINUED | COMMUNITY
Start: 2021-03-16 | End: 2022-05-16

## 2022-05-16 RX ORDER — BACILLUS COAGULANS/INULIN 1B-250 MG
CAPSULE ORAL
Refills: 0 | Status: DISCONTINUED | COMMUNITY
End: 2022-05-16

## 2022-05-16 RX ORDER — AZITHROMYCIN 500 MG/1
500 TABLET, FILM COATED ORAL
Qty: 24 | Refills: 6 | Status: DISCONTINUED | COMMUNITY
Start: 2022-03-02 | End: 2022-05-16

## 2022-05-16 NOTE — ASSESSMENT
[FreeTextEntry1] : Mr. Marco Chen 66 yoM with pmhx COPD, MAC, GERD, and chronic pain here for follow up for chronic hepatitis C. \par \par #Chronic hepatitis C\par -Treatment naive, GT 1b, BL HCVRNA 5,962,594. Fibroscan test 4/7/22 suggestive of F3, S0. \par -Discussed with patient and wife hepatitis C treatment, potential side effects and SVR. Patient is interested in treatment. Will start preauthorization for DAA therapy, either Mavyret for 8 weeks or Epclusa for 12 weeks depending on insurance coverage. \par \par ## HBcAb pos and HBsAb neg, HBsAg neg, \par - Explained to patient that he was previously exposed to hepatitis B but currently does not have active hepatitis B and therefore no treatment needed this time. We discussed risk of hepatitis B reactivation with certain medications including chemotherapy and  immunosuppression which he might need prophylactic antiviral therapy, thus physicians have to be notified\par \par -Explained about the risk of HBV reactivation during HCV treatment although low and will not need prophylaxis and will be followed for signs of reactivation.\par \par #Advance hepatic fibrosis\par -Fibroscan test 4/7/22 suggestive of F3, S0, likely secondary to chronic hepatitis C. No significant history of heavy ETOH use. \par - I explained the risks for the development of cirrhosis, esophageal varices with and without bleeding, hepatic encephalopathy, ascites, hepatocellular carcinoma, and liver failure. I have explained that disease can progress to the point of requiring an evaluation for liver transplantation. I have explained the need for imaging every 6 months to screen for liver cancer.\par -No HCC on abdo US April 2022. AFP was normal. Next screening is Oct 2022. \par -Patient was counseled to: abstain from alcohol; avoid use of herbal and dietary supplements due to potential hepatotoxicity; limit use of acetaminophen to <2 grams per day.\par \par Plan:\par start preauthorization for DAA therapy, will see pt back 4-5 weeks after HCV treatment.

## 2022-05-16 NOTE — HISTORY OF PRESENT ILLNESS
[de-identified] : Mr. Marco Chen 66 yoM with pmhx COPD, MAC, GERD, and chronic pain here for follow up for chronic hepatitis C. \par \par 5/16/22 Here to discuss recent evaluation for hepatitis C. Has fatigue and brain fogginess. He has stopped Azithromycin 500 mg for MAC. Been taking omeprazole 40 mg for many yrs and is weaning off. Fibroscan test 4/7/22 suggestive of F3, S0. BW from 4/7/22 showed GT 1b, HCVRNA 5,962,594, HBcAb pos, HBsAb neg, HBsAg neg, AFP 7.6, ALT 45, AST 73, , TB 0.8, CR 0.74, PLTs 176,000, HGB 13.9. Abdo US 4/9/22 showed no hepatic lesions, mild splenomegaly. \par \par 4/4/22 Initial evaluation: He reports being diagnosed with hepatitis C in 2000 and was told at the time that he did not need treatment due to low viral counts.  Over the yrs, he was told that his liver was fine. He never had a liver biopsy or noninvasive testing to assess for fibrosis. Denies history of jaundice or decompensated liver disease in the past. \par \par Never use IVD but did used cocaine for short period of time when he was young. Doesn't know if he had blood transfusion when he had surgery at age 17. He drinks alcohol socially usually with dinner but hasn't been socializing in the last 2 yrs. \par \par BW 2/4/22 ALT 22, AST 36, , Tbil 0.8, Albumin 3.7, plts 264,000\par BW 6/17/21 HCVRNA 4,350,000

## 2022-05-16 NOTE — PHYSICAL EXAM
[General Appearance - Alert] : alert [General Appearance - In No Acute Distress] : in no acute distress [Sclera] : the sclera and conjunctiva were normal [Neck Appearance] : the appearance of the neck was normal [Neck Cervical Mass (___cm)] : no neck mass was observed [] : no respiratory distress [Respiration, Rhythm And Depth] : normal respiratory rhythm and effort [Exaggerated Use Of Accessory Muscles For Inspiration] : no accessory muscle use [Heart Sounds] : normal S1 and S2 [Edema] : there was no peripheral edema [Bowel Sounds] : normal bowel sounds [Abdomen Soft] : soft [Abdomen Tenderness] : non-tender [Oriented To Time, Place, And Person] : oriented to person, place, and time [Heart Rate And Rhythm] : heart rate was normal and rhythm regular [Scleral Icterus] : No Scleral Icterus [Abdominal  Ascites] : no ascites [Asterixis] : no asterixis observed [Jaundice] : No jaundice [Palmar Erythema] : no Palmar Erythema [FreeTextEntry1] : +murmur

## 2022-05-16 NOTE — REVIEW OF SYSTEMS
Subjective   Patient ID: Heather is a 37 year old female.    Chief Complaint   Patient presents with   • Back Pain     severe back pain started wednesday pain-8   • Menstrual Problem     2 periods within a 15 day period   • Referral     Derm     HPI   Getting severe back pain.  No pain down the leg.  Been taking ibuprofen and heating pad and bath.  Does not help at all.  Standing or sitting makes it worse.  Nothing makes it better.   Is sexually active.  On the menstral cycle now.  Last one was July 18th.  Had lower back pain a few weeks ago which went away on it own. No fevers.  No suprapubic pain.  No urinary symptoms        Heather has a current medication list which includes the following prescription(s): prednisone, clotrimazole, vitamin d, and ferrous sulfate dried.  Heather has No Known Allergies.    Review of Systems   Respiratory: Negative for shortness of breath.    Cardiovascular: Negative for chest pain.   Neurological: Negative for weakness.     Objective   Physical Exam  Vitals reviewed.   Constitutional:       General: She is not in acute distress.  Cardiovascular:      Rate and Rhythm: Normal rate and regular rhythm.   Pulmonary:      Effort: Pulmonary effort is normal. No respiratory distress.      Breath sounds: Normal breath sounds. No wheezing.   Musculoskeletal:      Comments: Pain with pushing on the paraspinals bilat on the lower back, no pain over the spine   Skin:     Findings: No rash.   Neurological:      Mental Status: She is alert.      Sensory: No sensory deficit.      Motor: No weakness.      Deep Tendon Reflexes: Reflexes normal.      Comments: Negative straigh leg and psoas test   Psychiatric:         Mood and Affect: Mood normal.         Behavior: Behavior normal.       Assessment   Problem List Items Addressed This Visit        Musculoskeletal and Injuries    Acute midline low back pain without sciatica - Primary     Been doing nsaids  Will check a urine to make sure there is not  infection ==> she refused it  No sign or symptoms of infection  I can not think any reason a premature period would have anything to do with this  On her period now so not pregnant  Going to go see dr genao  Let me know if does not get better              [Cough] : cough [Negative] : Genitourinary [Fever] : no fever [Chills] : no chills [Chest Pain] : no chest pain [FreeTextEntry9] : joint pain

## 2022-06-02 ENCOUNTER — NON-APPOINTMENT (OUTPATIENT)
Age: 67
End: 2022-06-02

## 2022-06-02 RX ORDER — GLECAPREVIR AND PIBRENTASVIR 40; 100 MG/1; MG/1
100-40 TABLET, FILM COATED ORAL
Qty: 84 | Refills: 1 | Status: ACTIVE | COMMUNITY
Start: 2022-06-02 | End: 1900-01-01

## 2022-06-09 ENCOUNTER — APPOINTMENT (OUTPATIENT)
Dept: RADIOLOGY | Facility: IMAGING CENTER | Age: 67
End: 2022-06-09

## 2022-06-09 ENCOUNTER — APPOINTMENT (OUTPATIENT)
Dept: PULMONOLOGY | Facility: CLINIC | Age: 67
End: 2022-06-09
Payer: MEDICARE

## 2022-06-09 VITALS
HEIGHT: 69 IN | HEART RATE: 90 BPM | DIASTOLIC BLOOD PRESSURE: 84 MMHG | TEMPERATURE: 97.6 F | WEIGHT: 142 LBS | RESPIRATION RATE: 16 BRPM | BODY MASS INDEX: 21.03 KG/M2 | SYSTOLIC BLOOD PRESSURE: 147 MMHG

## 2022-06-09 DIAGNOSIS — J18.9 PNEUMONIA, UNSPECIFIED ORGANISM: ICD-10-CM

## 2022-06-09 DIAGNOSIS — J47.9 BRONCHIECTASIS, UNCOMPLICATED: ICD-10-CM

## 2022-06-09 DIAGNOSIS — Z87.01 PERSONAL HISTORY OF PNEUMONIA (RECURRENT): ICD-10-CM

## 2022-06-09 DIAGNOSIS — A31.0 PULMONARY MYCOBACTERIAL INFECTION: ICD-10-CM

## 2022-06-09 PROCEDURE — 99213 OFFICE O/P EST LOW 20 MIN: CPT

## 2022-06-09 RX ORDER — IPRATROPIUM BROMIDE AND ALBUTEROL SULFATE 2.5; .5 MG/3ML; MG/3ML
0.5-2.5 (3) SOLUTION RESPIRATORY (INHALATION) 4 TIMES DAILY
Qty: 2 | Refills: 11 | Status: ACTIVE | COMMUNITY
Start: 2022-03-30 | End: 1900-01-01

## 2022-06-09 NOTE — ASSESSMENT
[FreeTextEntry1] : The patient continues to smoke despite his severe emphysema. I had a ganesh discussion with him and his wife about his mycobacterial infection. If he is unable to take the azithromycin, I do not see a point in pursuing alternative medications. If he were able to take it at 500 mg 3 times a week, I would add ethambutol. A decision regarding rifampin would have to be made in concert with his hepatologist.\par In the past he has found a reason not to take antimycobacterial medication and blames his fatigue and other symptoms on any new recent medication.\par He will follow up with infectious disease and with his hepatologist.\par I will reorder his nebulizer as well as his nebulizer solutions even though both were ordered in March

## 2022-06-09 NOTE — PHYSICAL EXAM
[No Acute Distress] : no acute distress [Normal Appearance] : normal appearance [Normal Rate/Rhythm] : normal rate/rhythm [No Resp Distress] : no resp distress [Normal Gait] : normal gait [No Clubbing] : no clubbing [No Focal Deficits] : no focal deficits [Oriented x3] : oriented x3 [TextBox_54] : 3/6 systolic regurgitant murmur left apex [TextBox_68] : coarse crackles both upper lobes

## 2022-06-09 NOTE — REVIEW OF SYSTEMS
[Fatigue] : fatigue [Poor Dentition] : poor dentition [Dyspnea] : dyspnea [Arthralgias] : arthralgias [Negative] : Gastrointestinal

## 2022-06-09 NOTE — HISTORY OF PRESENT ILLNESS
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 The patient has been trying to take azithromycin a half a tablet 3 times a week because he has trouble taking a full tablet because of potential side effects. Even with a half a tablet he is blaming his fatigue and his inability to do anything as a side effect of the azithromycin. He is complaining of increasing dyspnea, shoulder pain and inability to afford medication for his hepatitis C virus infection.

## 2022-06-10 ENCOUNTER — OUTPATIENT (OUTPATIENT)
Dept: OUTPATIENT SERVICES | Facility: HOSPITAL | Age: 67
LOS: 1 days | End: 2022-06-10
Payer: MEDICARE

## 2022-06-10 ENCOUNTER — APPOINTMENT (OUTPATIENT)
Dept: RADIOLOGY | Facility: CLINIC | Age: 67
End: 2022-06-10
Payer: MEDICARE

## 2022-06-10 DIAGNOSIS — Z87.01 PERSONAL HISTORY OF PNEUMONIA (RECURRENT): ICD-10-CM

## 2022-06-10 DIAGNOSIS — J18.9 PNEUMONIA, UNSPECIFIED ORGANISM: ICD-10-CM

## 2022-06-10 PROCEDURE — 71046 X-RAY EXAM CHEST 2 VIEWS: CPT | Mod: 26

## 2022-06-10 PROCEDURE — 71046 X-RAY EXAM CHEST 2 VIEWS: CPT

## 2022-06-20 ENCOUNTER — APPOINTMENT (OUTPATIENT)
Dept: INFECTIOUS DISEASE | Facility: CLINIC | Age: 67
End: 2022-06-20
Payer: MEDICARE

## 2022-06-20 VITALS
WEIGHT: 141 LBS | SYSTOLIC BLOOD PRESSURE: 136 MMHG | HEIGHT: 69 IN | TEMPERATURE: 98.4 F | BODY MASS INDEX: 20.88 KG/M2 | RESPIRATION RATE: 16 BRPM | DIASTOLIC BLOOD PRESSURE: 79 MMHG | OXYGEN SATURATION: 97 % | HEART RATE: 91 BPM

## 2022-06-20 PROCEDURE — 99214 OFFICE O/P EST MOD 30 MIN: CPT

## 2022-06-20 NOTE — CONSULT LETTER
[Dear  ___] : Dear  [unfilled], [Consult Letter:] : I had the pleasure of evaluating your patient, [unfilled]. [Please see my note below.] : Please see my note below. [Consult Closing:] : Thank you very much for allowing me to participate in the care of this patient.  If you have any questions, please do not hesitate to contact me. [Sincerely,] : Sincerely, [DrKris  ___] : Dr. MONTAGUE [FreeTextEntry2] : Dr. Nikunj Hallman [FreeTextEntry3] : \par Josephine Steve MD\par  of Medicine\par Division of Infectious Diseases\par [facility] [employer] [facility] [employer] [facility]\par [address] [place] [address] DrKris\[place] [address]\par Tel: [phone]\par Fax: [phone]

## 2022-06-20 NOTE — HISTORY OF PRESENT ILLNESS
[FreeTextEntry1] : This is a 66 M, smoker, advanced emphysema, abnormal CT chest suggestive of bronchiectasis RUL, Hepatitis C, heart murmur, back surgery, chronic pain on long standing opiates, presents today for MAC and new changes on CT in Left Upper Lobe of  lung. \par \par He reports cough, produces phlegm.\par Losing weight. \par Feels very fatigued.\par Reports swallowing issues\par Denies fevers. \par \par Reports that he was initially found to have MAC in 2013. Treated at [place]. Given mac meds and after 1 day had to stop them. He could not tolerate he says. Since was seen at [facility] for rule out malignancy and saw ID there.  Was never treated again for MAC but was followed.  Not able to f/up there anymore as he does not have h/o malignancy.\par \par \par Presents with wife today.\par \par Works as \par Long time smoker.\par 2 kids\par 4 grandchildren - live in [place].\par Never treated for Hep C\par Labs from 2/2022 have normal LFTs. \par \par 3/28/22 Visit:\par Since last visit tried azithromycin 500 mg on MWF x 1 week. By Friday felt very unwell and had to stop medication. Could not even try next med, which was planned to be ethambutol.  \par Pt reports he feels better now off of meds.\par Will be traveling to [place] and return early May. \par Also shows me labs from Hep C and VL was high over 4 million. \par \par 6/20/22:\par Now returns.\par Restarted azithromycin about 2 weeks ago and taking every other day.  Upsets his stomach but seems to tolerate it.  Is to start treatment for Hep C.  We disccussed this at length.

## 2022-06-20 NOTE — ASSESSMENT
[FreeTextEntry1] : This is a 66 M, smoker, advanced emphysema, abnormal CT chest suggestive of bronchiectasis RUL, Hepatitis C, heart murmur, back surgery, chronic pain on long standing opiates, presents today for MAC and new changes on CT in Left Uppler Lobe of  lung. \par \par 1) MAC:\par Long standing infection since 2013 based on history.\par I do not have prior culture data from outside hospitals.\par Never treated before.\par Agree pt does meet criteria for treatment and with positive cultures and changes on CT chest, is reasonable to attempt treatment.\par I am concerned that pt will not be able to tolerate due to intolerability and drug interactions as well.\par Agree with starting 1 drug at a time.  He seems to be tolerating azithromycin now though not very easily.\par \par He is to start treatment for Hep C.\par It may be wise to treat that first and then resume MAC treatment.  \par \par Will Plan:  azithro 500 mg po Mon/Wed/Fri.\par If can tolerate, can add Ethambutol 1200 mg po Mon/Wed/Fri next week.\par He does not want to use arikayce b/c already has hearing loss. Is willing to try clofazimine.\par Not sure rifampin is a good idea with his hydrocodone - can lower levels.\par Sensitivity done and was sensitive to macrolides and amikacin. \par \par Reached out to hepatology to see how long until he can start treatment for Hep C. \par \par 2) Bronchiectasis:\par Should do good mucus clearing as per pulmonary\par \par 3) Hep C:\par Advised f/up with hepatology. \par CBC and CMP okay 2/4/22.  \par \par 4) Side effects:\par Discuss side effects with pt and his wife at length.\par Given literature to review.\par Advised to see ophthal every 4 months while on ethambutol.\par No alcohol while on therapy\par \par For now will await to hear back from hepatology.\par LFTs mildly elevated. Hopefully treatment for hep c normalizes this and then can re-try treatment for MAC.\par Would stop azithro for now as i don't want to cause resistance.\par \par Time spent 35 minutes.

## 2022-06-20 NOTE — PHYSICAL EXAM
[General Appearance - Alert] : alert [General Appearance - In No Acute Distress] : in no acute distress [Sclera] : the sclera and conjunctiva were normal [PERRL With Normal Accommodation] : pupils were equal in size, round, reactive to light [Outer Ear] : the ears and nose were normal in appearance [Neck Appearance] : the appearance of the neck was normal [Exaggerated Use Of Accessory Muscles For Inspiration] : no accessory muscle use [Heart Rate And Rhythm] : heart rate was normal and rhythm regular [Heart Sounds] : normal S1 and S2 [Skin Color & Pigmentation] : normal skin color and pigmentation [] : no rash [No Focal Deficits] : no focal deficits [Oriented To Time, Place, And Person] : oriented to person, place, and time [Affect] : the affect was normal [FreeTextEntry1] : +loud murmur

## 2022-06-23 ENCOUNTER — NON-APPOINTMENT (OUTPATIENT)
Age: 67
End: 2022-06-23

## 2022-06-24 ENCOUNTER — NON-APPOINTMENT (OUTPATIENT)
Age: 67
End: 2022-06-24

## 2022-06-27 ENCOUNTER — NON-APPOINTMENT (OUTPATIENT)
Age: 67
End: 2022-06-27

## 2022-06-28 ENCOUNTER — APPOINTMENT (OUTPATIENT)
Dept: PULMONOLOGY | Facility: CLINIC | Age: 67
End: 2022-06-28

## 2022-06-28 VITALS
HEART RATE: 87 BPM | BODY MASS INDEX: 20.14 KG/M2 | OXYGEN SATURATION: 95 % | DIASTOLIC BLOOD PRESSURE: 86 MMHG | WEIGHT: 136 LBS | SYSTOLIC BLOOD PRESSURE: 138 MMHG | TEMPERATURE: 97.3 F | HEIGHT: 69 IN

## 2022-06-28 DIAGNOSIS — R05.9 COUGH, UNSPECIFIED: ICD-10-CM

## 2022-06-28 DIAGNOSIS — J44.9 CHRONIC OBSTRUCTIVE PULMONARY DISEASE, UNSPECIFIED: ICD-10-CM

## 2022-06-28 DIAGNOSIS — J47.1 BRONCHIECTASIS WITH (ACUTE) EXACERBATION: ICD-10-CM

## 2022-06-28 PROCEDURE — 99215 OFFICE O/P EST HI 40 MIN: CPT

## 2022-06-28 RX ORDER — ALBUTEROL SULFATE 90 UG/1
108 (90 BASE) INHALANT RESPIRATORY (INHALATION)
Qty: 1 | Refills: 2 | Status: ACTIVE | COMMUNITY
Start: 2022-06-28 | End: 1900-01-01

## 2022-06-28 NOTE — HISTORY OF PRESENT ILLNESS
[TextBox_4] : 66M with active mycobacterial avium infection. Was on Azithromycin (only took half a tablet and complained of fatigue and other side effects on it). He was seen by ID and it was advised to hold off tx for MAC until his HCV. \par \par Pt called on 6/24 and reported increased SOB, worse at night, f/c. night sweats. temp 101 x 2 days. \par productive cough with yellow-brown sputum. no blood. Some wheezing which improves when he coughs up phlegm.\par His SaO2 97-98% at home. Home covid test was negative. \par He reports having similar sx in February when he had a PNA and was treated with Levaquin.\par \par He was advised to use nebulizer 4 x daily, Rx levaquin 500mg, tylenol for fever. Continue monitoring oxygen saturation. He called us back yesterday stating he is not feeling better. SaO2 was dropping to 89% at rest. Wife said pt is delirious at time but pt refused to go to ED. He was started on Prednisone 20mg x 4 days and comes in today for eval.  \par \par \par Today he reports feeling better since stating prednisone yesterday. His sputum has cleared and he is breathing better. No fever. Contiues Levaquin.

## 2022-06-28 NOTE — PHYSICAL EXAM
[No Acute Distress] : no acute distress [Well Nourished] : well nourished [Well Developed] : well developed [Normal Oropharynx] : normal oropharynx [Normal Rate/Rhythm] : normal rate/rhythm [Normal S1, S2] : normal s1, s2 [No Resp Distress] : no resp distress [Clear to Auscultation Bilaterally] : clear to auscultation bilaterally [No Focal Deficits] : no focal deficits [Oriented x3] : oriented x3 [Normal Affect] : normal affect

## 2022-06-28 NOTE — END OF VISIT
[FreeTextEntry3] : I personally confirmed history and exam and discussed plan with PORSHA Kim.   [Time Spent: ___ minutes] : I have spent [unfilled] minutes of time on the encounter.

## 2022-06-28 NOTE — ASSESSMENT
[FreeTextEntry1] : 66M with hx of COPD, Bronchiectasis with LUCHO, and HCV. He presents today due to bronchiectasis exacerbation. He is on levaquin 500mg and Prednisone 20mg. he reports feeling better. fever resolved, sputum is clear today and breathing has improved. SaO2 97% at rest\par \par CXR 6/10/22\par Similar LEFT upper lobe multi focal airspace consolidations with fibrocystic parenchymal changes and increased volume loss compared to prior chest radiograph. RIGHT upper lobe stable linear atelectasis.\par Personally Reviewed prior CT Chest from 1/2022 which shows emphysema, and consolidation with bronchiectasis in the upper lobe.  \par \par \par Plan:\par \par Rx albuterol prn\par Continue Levaquin for total of 10 days\par Continue Prednisone for total of 4 days\par Discussed consult with sleep psychiatrist, pt deferred \par Sputum culture today\par F/u with Dr. Hallman

## 2022-06-30 ENCOUNTER — NON-APPOINTMENT (OUTPATIENT)
Age: 67
End: 2022-06-30

## 2022-06-30 LAB — BACTERIA SPT CULT: NORMAL

## 2022-07-05 ENCOUNTER — NON-APPOINTMENT (OUTPATIENT)
Age: 67
End: 2022-07-05

## 2022-07-08 ENCOUNTER — NON-APPOINTMENT (OUTPATIENT)
Age: 67
End: 2022-07-08

## 2022-07-15 ENCOUNTER — NON-APPOINTMENT (OUTPATIENT)
Age: 67
End: 2022-07-15

## 2022-07-18 ENCOUNTER — NON-APPOINTMENT (OUTPATIENT)
Age: 67
End: 2022-07-18

## 2022-07-28 ENCOUNTER — RESULT REVIEW (OUTPATIENT)
Age: 67
End: 2022-07-28

## 2022-07-28 ENCOUNTER — OUTPATIENT (OUTPATIENT)
Dept: OUTPATIENT SERVICES | Facility: HOSPITAL | Age: 67
LOS: 1 days | End: 2022-07-28
Payer: MEDICARE

## 2022-07-28 ENCOUNTER — TRANSCRIPTION ENCOUNTER (OUTPATIENT)
Age: 67
End: 2022-07-28

## 2022-07-28 VITALS
SYSTOLIC BLOOD PRESSURE: 141 MMHG | HEART RATE: 80 BPM | TEMPERATURE: 98 F | DIASTOLIC BLOOD PRESSURE: 73 MMHG | RESPIRATION RATE: 18 BRPM | OXYGEN SATURATION: 99 % | HEIGHT: 69 IN | WEIGHT: 130.07 LBS

## 2022-07-28 VITALS
RESPIRATION RATE: 15 BRPM | DIASTOLIC BLOOD PRESSURE: 73 MMHG | OXYGEN SATURATION: 96 % | HEART RATE: 75 BPM | SYSTOLIC BLOOD PRESSURE: 129 MMHG

## 2022-07-28 DIAGNOSIS — A41.59 OTHER GRAM-NEGATIVE SEPSIS: Chronic | ICD-10-CM

## 2022-07-28 DIAGNOSIS — Z98.890 OTHER SPECIFIED POSTPROCEDURAL STATES: Chronic | ICD-10-CM

## 2022-07-28 DIAGNOSIS — Z00.00 ENCOUNTER FOR GENERAL ADULT MEDICAL EXAMINATION WITHOUT ABNORMAL FINDINGS: ICD-10-CM

## 2022-07-28 PROCEDURE — 31624 DX BRONCHOSCOPE/LAVAGE: CPT | Mod: LT

## 2022-07-28 PROCEDURE — 87206 SMEAR FLUORESCENT/ACID STAI: CPT

## 2022-07-28 PROCEDURE — 88312 SPECIAL STAINS GROUP 1: CPT | Mod: 26

## 2022-07-28 PROCEDURE — 88112 CYTOPATH CELL ENHANCE TECH: CPT

## 2022-07-28 PROCEDURE — 76000 FLUOROSCOPY <1 HR PHYS/QHP: CPT

## 2022-07-28 PROCEDURE — 88305 TISSUE EXAM BY PATHOLOGIST: CPT | Mod: 26

## 2022-07-28 PROCEDURE — 87102 FUNGUS ISOLATION CULTURE: CPT

## 2022-07-28 PROCEDURE — 71045 X-RAY EXAM CHEST 1 VIEW: CPT | Mod: 26

## 2022-07-28 PROCEDURE — 88312 SPECIAL STAINS GROUP 1: CPT | Mod: 26,59

## 2022-07-28 PROCEDURE — 87116 MYCOBACTERIA CULTURE: CPT

## 2022-07-28 PROCEDURE — 31628 BRONCHOSCOPY/LUNG BX EACH: CPT | Mod: LT

## 2022-07-28 PROCEDURE — 88305 TISSUE EXAM BY PATHOLOGIST: CPT | Mod: 26,59

## 2022-07-28 PROCEDURE — 88112 CYTOPATH CELL ENHANCE TECH: CPT | Mod: 26

## 2022-07-28 PROCEDURE — C9399: CPT

## 2022-07-28 PROCEDURE — 87015 SPECIMEN INFECT AGNT CONCNTJ: CPT

## 2022-07-28 PROCEDURE — 88305 TISSUE EXAM BY PATHOLOGIST: CPT

## 2022-07-28 PROCEDURE — 71045 X-RAY EXAM CHEST 1 VIEW: CPT

## 2022-07-28 PROCEDURE — 88312 SPECIAL STAINS GROUP 1: CPT

## 2022-07-28 PROCEDURE — 87070 CULTURE OTHR SPECIMN AEROBIC: CPT

## 2022-07-28 RX ORDER — SODIUM CHLORIDE 9 MG/ML
500 INJECTION INTRAMUSCULAR; INTRAVENOUS; SUBCUTANEOUS
Refills: 0 | Status: DISCONTINUED | OUTPATIENT
Start: 2022-07-28 | End: 2022-08-11

## 2022-07-28 RX ORDER — ZOLPIDEM TARTRATE 10 MG/1
1 TABLET ORAL
Qty: 0 | Refills: 0 | DISCHARGE

## 2022-07-28 RX ORDER — HYDROCODONE BITARTRATE 50 MG/1
1 CAPSULE, EXTENDED RELEASE ORAL
Qty: 0 | Refills: 0 | DISCHARGE

## 2022-07-28 RX ORDER — GABAPENTIN 400 MG/1
1 CAPSULE ORAL
Qty: 0 | Refills: 0 | DISCHARGE

## 2022-07-28 RX ORDER — OMEPRAZOLE 10 MG/1
1 CAPSULE, DELAYED RELEASE ORAL
Qty: 0 | Refills: 0 | DISCHARGE

## 2022-07-28 RX ORDER — DICLOFENAC SODIUM 75 MG/1
0 TABLET, DELAYED RELEASE ORAL
Qty: 0 | Refills: 0 | DISCHARGE

## 2022-07-28 NOTE — ASU PATIENT PROFILE, ADULT - NSICDXPASTSURGICALHX_GEN_ALL_CORE_FT
PAST SURGICAL HISTORY:  H/O right knee surgery     Other gram-negative sepsis     S/P hernia repair

## 2022-07-28 NOTE — ASU DISCHARGE PLAN (ADULT/PEDIATRIC) - NS MD DC FALL RISK RISK
For information on Fall & Injury Prevention, visit: [email or web address] OR  [email or web address] OR  https://www.cdc.gov/steadi/patient.html

## 2022-07-28 NOTE — PRE-ANESTHESIA EVALUATION ADULT - NSANTHPMHFT_GEN_ALL_CORE
Hard chart reviewed. 67 yo m. PMHX as above.  No signs of cirrhosis on physical exam.  Chornic MAC infection.  Here for bronchoscopt

## 2022-07-28 NOTE — ASU PATIENT PROFILE, ADULT - NSICDXPASTMEDICALHX_GEN_ALL_CORE_FT
PAST MEDICAL HISTORY:  Arthritis     COPD, moderate     Deviated septum     Hepatitis B infection with hepatitis C infection     Hepatitis C

## 2022-07-28 NOTE — PRE-ANESTHESIA EVALUATION ADULT - BP NONINVASIVE SYSTOLIC (MM HG)
Final Anesthesia Post-op Assessment    Patient: Camila Zhoudean  Procedure(s) Performed: VAGINAL HYSTERECTOMY WITH RIGHT SALIPNGO-OOPHORECTOMYVAGINAL HYSTERECTOMY WITH RIGHT SALIPNGO-OOPHORECTOMY  Anesthesia type: General    Vital Last Value   Temperature 36.3 °C (97.4 °F) (08/11/17 1101)   Pulse 76 (08/11/17 1131)   Respiratory Rate 16 (08/11/17 1131)   Non-Invasive   Blood Pressure 133/68 (08/11/17 1131)   Arterial  Blood Pressure     Pulse Oximetry 96 % (08/11/17 1131)     Last 24 I/O:   Intake/Output Summary (Last 24 hours) at 08/11/17 1201  Last data filed at 08/11/17 1041   Gross per 24 hour   Intake             1750 ml   Output              100 ml   Net             1650 ml       PATIENT LOCATION: PACU Phase 1  POST-OP VITAL SIGNS: stable  LEVEL OF CONSCIOUSNESS: participates in exam, answers questions appropriately, awake, alert and oriented  RESPIRATORY STATUS: spontaneous ventilation  CARDIOVASCULAR: blood pressure returned to baseline and stable  HYDRATION: euvolemic    PAIN MANAGEMENT: adequately controlled  NAUSEA: Moderate (persisted < 2-24 hrs post-op)  AIRWAY PATENCY: patent  POST-OP ASSESSMENT: no complications, patient tolerated procedure well with no complications, no evidence of recall and sufficiently recovered from acute administration of anesthesia effects and able to participate in evaluation  COMPLICATIONS: none      
141

## 2022-07-28 NOTE — ASU DISCHARGE PLAN (ADULT/PEDIATRIC) - CARE PROVIDER_API CALL
César Juarez)  Critical Care Medicine; Internal Medicine; Pulmonary Disease  [address], [place] [address]  Phone: [phone]  Fax: [phone]  Follow Up Time:

## 2022-07-29 LAB
GRAM STN FLD: SIGNIFICANT CHANGE UP
NIGHT BLUE STAIN TISS: SIGNIFICANT CHANGE UP
SPECIMEN SOURCE: SIGNIFICANT CHANGE UP
SPECIMEN SOURCE: SIGNIFICANT CHANGE UP
SURGICAL PATHOLOGY STUDY: SIGNIFICANT CHANGE UP

## 2022-07-31 LAB
CULTURE RESULTS: NO GROWTH — SIGNIFICANT CHANGE UP
SPECIMEN SOURCE: SIGNIFICANT CHANGE UP

## 2022-08-27 LAB
CULTURE RESULTS: SIGNIFICANT CHANGE UP
SPECIMEN SOURCE: SIGNIFICANT CHANGE UP

## 2022-09-17 LAB
CULTURE RESULTS: SIGNIFICANT CHANGE UP
SPECIMEN SOURCE: SIGNIFICANT CHANGE UP

## 2022-09-20 ENCOUNTER — NON-APPOINTMENT (OUTPATIENT)
Age: 67
End: 2022-09-20

## 2022-10-04 ENCOUNTER — OUTPATIENT (OUTPATIENT)
Dept: OUTPATIENT SERVICES | Facility: HOSPITAL | Age: 67
LOS: 1 days | End: 2022-10-04
Payer: MEDICARE

## 2022-10-04 ENCOUNTER — APPOINTMENT (OUTPATIENT)
Dept: CT IMAGING | Facility: CLINIC | Age: 67
End: 2022-10-04

## 2022-10-04 DIAGNOSIS — Z98.890 OTHER SPECIFIED POSTPROCEDURAL STATES: Chronic | ICD-10-CM

## 2022-10-04 DIAGNOSIS — A41.59 OTHER GRAM-NEGATIVE SEPSIS: Chronic | ICD-10-CM

## 2022-10-04 DIAGNOSIS — J44.9 CHRONIC OBSTRUCTIVE PULMONARY DISEASE, UNSPECIFIED: ICD-10-CM

## 2022-10-04 PROBLEM — B19.20 UNSPECIFIED VIRAL HEPATITIS C WITHOUT HEPATIC COMA: Chronic | Status: ACTIVE | Noted: 2022-07-28

## 2022-10-04 PROBLEM — M19.90 UNSPECIFIED OSTEOARTHRITIS, UNSPECIFIED SITE: Chronic | Status: ACTIVE | Noted: 2022-07-28

## 2022-10-04 PROBLEM — J34.2 DEVIATED NASAL SEPTUM: Chronic | Status: ACTIVE | Noted: 2022-07-28

## 2022-10-04 PROCEDURE — 71250 CT THORAX DX C-: CPT | Mod: 26,MH

## 2022-10-04 PROCEDURE — 71250 CT THORAX DX C-: CPT

## 2022-10-25 ENCOUNTER — NON-APPOINTMENT (OUTPATIENT)
Age: 67
End: 2022-10-25

## 2022-11-15 ENCOUNTER — APPOINTMENT (OUTPATIENT)
Dept: ULTRASOUND IMAGING | Facility: CLINIC | Age: 67
End: 2022-11-15

## 2022-11-15 PROCEDURE — 76700 US EXAM ABDOM COMPLETE: CPT

## 2022-12-14 VITALS — HEIGHT: 69 IN

## 2022-12-30 ENCOUNTER — NON-APPOINTMENT (OUTPATIENT)
Age: 67
End: 2022-12-30

## 2022-12-30 DIAGNOSIS — Z87.39 PERSONAL HISTORY OF OTHER DISEASES OF THE MUSCULOSKELETAL SYSTEM AND CONNECTIVE TISSUE: ICD-10-CM

## 2022-12-30 DIAGNOSIS — F11.20 OPIOID DEPENDENCE, UNCOMPLICATED: ICD-10-CM

## 2022-12-30 DIAGNOSIS — Z87.19 PERSONAL HISTORY OF OTHER DISEASES OF THE DIGESTIVE SYSTEM: ICD-10-CM

## 2022-12-30 DIAGNOSIS — Z79.891 LONG TERM (CURRENT) USE OF OPIATE ANALGESIC: ICD-10-CM

## 2022-12-30 RX ORDER — ALPRAZOLAM 0.25 MG/1
0.25 TABLET ORAL
Refills: 0 | Status: ACTIVE | COMMUNITY

## 2023-01-13 ENCOUNTER — APPOINTMENT (OUTPATIENT)
Dept: PAIN MANAGEMENT | Facility: CLINIC | Age: 68
End: 2023-01-13
Payer: MEDICARE

## 2023-01-13 VITALS — HEIGHT: 69 IN | WEIGHT: 136 LBS | BODY MASS INDEX: 20.14 KG/M2

## 2023-01-13 PROCEDURE — 99213 OFFICE O/P EST LOW 20 MIN: CPT | Mod: 95

## 2023-01-13 NOTE — REASON FOR VISIT
[Follow-Up Visit] : a follow-up pain management visit [Home] : at home, [unfilled] , at the time of the visit. [Medical Office: ([facility])___] : at the medical office located in  [Patient] : the patient [Self] : self [FreeTextEntry2] : Back pain

## 2023-01-13 NOTE — HISTORY OF PRESENT ILLNESS
[Neck] : neck [Lower back] : lower back [Gradual] : gradual [8] : 8 [6] : 6 [Dull/Aching] : dull/aching [Stabbing] : stabbing [Constant] : constant [Household chores] : household chores [Work] : work [Sleep] : sleep [Rest] : rest [Meds] : meds [Sitting] : sitting [Standing] : standing [Walking] : walking [Physical therapy] : physical therapy [Retired] : Work status: retired [] : no [FreeTextEntry1] : B/L KNEES

## 2023-01-13 NOTE — DISCUSSION/SUMMARY
[Medication Risks Reviewed] : Medication risks reviewed [de-identified] : Prescriptions renewed. [place] and appropriate. SOAPP-R completed on chart. The patient's medications are documented to the best of their ability. Quality of life and functional ability improved on medications. The patient is showing no aberrant behavior or evidence of diversion. The patient was advised not to use narcotic medication while operating an automobile or heavy machinery due to potential sedation or dizziness. The patient was educated to the risks associated with potential opioid dependence and addiction. Urine toxicology screens as per office protocol. Use of multimodal analgesia used prn.Follow up one month.\par

## 2023-02-06 ENCOUNTER — APPOINTMENT (OUTPATIENT)
Dept: INFECTIOUS DISEASE | Facility: CLINIC | Age: 68
End: 2023-02-06
Payer: MEDICARE

## 2023-02-06 VITALS
SYSTOLIC BLOOD PRESSURE: 119 MMHG | TEMPERATURE: 97.6 F | HEIGHT: 69 IN | BODY MASS INDEX: 20.14 KG/M2 | DIASTOLIC BLOOD PRESSURE: 74 MMHG | WEIGHT: 136 LBS | OXYGEN SATURATION: 98 %

## 2023-02-06 PROCEDURE — 99213 OFFICE O/P EST LOW 20 MIN: CPT

## 2023-02-06 RX ORDER — DICLOFENAC SODIUM 75 MG/1
75 TABLET, DELAYED RELEASE ORAL
Refills: 0 | Status: DISCONTINUED | COMMUNITY
Start: 2021-03-16 | End: 2023-02-06

## 2023-02-06 RX ORDER — PREDNISONE 20 MG/1
20 TABLET ORAL
Qty: 4 | Refills: 0 | Status: DISCONTINUED | COMMUNITY
Start: 2022-06-27 | End: 2023-02-06

## 2023-02-06 RX ORDER — SACUBITRIL AND VALSARTAN 49; 51 MG/1; MG/1
TABLET, FILM COATED ORAL
Refills: 0 | Status: ACTIVE | COMMUNITY

## 2023-02-06 RX ORDER — CLOPIDOGREL 75 MG/1
75 TABLET, FILM COATED ORAL
Refills: 0 | Status: ACTIVE | COMMUNITY

## 2023-02-06 RX ORDER — ASPIRIN 81 MG
81 TABLET, DELAYED RELEASE (ENTERIC COATED) ORAL
Refills: 0 | Status: ACTIVE | COMMUNITY

## 2023-02-06 RX ORDER — FUROSEMIDE 20 MG/1
20 TABLET ORAL
Refills: 0 | Status: ACTIVE | COMMUNITY

## 2023-02-06 RX ORDER — ZOLPIDEM TARTRATE 10 MG/1
10 TABLET ORAL
Qty: 30 | Refills: 0 | Status: DISCONTINUED | COMMUNITY
Start: 2021-08-09 | End: 2023-02-06

## 2023-02-06 RX ORDER — HYDROCODONE BITARTRATE AND ACETAMINOPHEN 10; 325 MG/1; MG/1
10-325 TABLET ORAL
Qty: 180 | Refills: 0 | Status: DISCONTINUED | COMMUNITY
Start: 2023-01-13 | End: 2023-02-06

## 2023-02-06 RX ORDER — SENNOSIDES 8.6 MG TABLETS 8.6 MG/1
TABLET ORAL
Refills: 0 | Status: ACTIVE | COMMUNITY

## 2023-02-06 RX ORDER — LEVOFLOXACIN 500 MG/1
500 TABLET, FILM COATED ORAL DAILY
Qty: 10 | Refills: 0 | Status: DISCONTINUED | COMMUNITY
Start: 2021-03-26 | End: 2023-02-06

## 2023-02-06 RX ORDER — METOPROLOL TARTRATE 25 MG/1
25 TABLET, FILM COATED ORAL
Refills: 0 | Status: ACTIVE | COMMUNITY

## 2023-02-06 RX ORDER — HYDROCODONE BITARTRATE AND ACETAMINOPHEN 10; 325 MG/1; MG/1
10-325 TABLET ORAL
Refills: 0 | Status: DISCONTINUED | COMMUNITY
End: 2023-02-06

## 2023-02-10 ENCOUNTER — APPOINTMENT (OUTPATIENT)
Dept: PAIN MANAGEMENT | Facility: CLINIC | Age: 68
End: 2023-02-10
Payer: MEDICARE

## 2023-02-10 VITALS — BODY MASS INDEX: 20.14 KG/M2 | HEIGHT: 69 IN | WEIGHT: 136 LBS

## 2023-02-10 PROCEDURE — 99213 OFFICE O/P EST LOW 20 MIN: CPT | Mod: 95

## 2023-02-10 NOTE — HISTORY OF PRESENT ILLNESS
[Neck] : neck [Lower back] : lower back [10] : 10 [Burning] : burning [Dull/Aching] : dull/aching [Shooting] : shooting [Stabbing] : stabbing [Throbbing] : throbbing [Constant] : constant [Household chores] : household chores [Sleep] : sleep [Rest] : rest [Meds] : meds [Ice] : ice [Heat] : heat [Massage] : massage [Home] : at home, [unfilled] , at the time of the visit. [Medical Office: ([facility])___] : at the medical office located in  [Verbal consent obtained from patient] : the patient, [unfilled] [] : Post Surgical Visit: no [FreeTextEntry7] : KNEE  [de-identified] : ALL MOVMENT

## 2023-02-13 NOTE — ASSESSMENT
[FreeTextEntry1] : This is a 67 M, smoker, advanced emphysema, abnormal CT chest suggestive of bronchiectasis RUL, Hepatitis C, heart murmur, back surgery, chronic pain on long standing opiates, presents today for MAC and new changes on CT in Left Uppler Lobe of  lung. \par \par 1) MAC:\par Long standing infection since 2013 based on history.\par I do not have prior culture data from outside hospitals.\par Never treated before.\par Agree pt does meet criteria for treatment and with positive cultures and changes on CT chest, is reasonable to attempt treatment.\par I am concerned that pt will not be able to tolerate due to intolerability and drug interactions as well.\par He has Hep C and it was felt better to treat this first, then start treatment for MAC.  \par \par He is now on plavix and entresto and will need to review drug interactions before giving any MAC meds. \par \par Will Plan:  azithro 500 mg po Mon/Wed/Fri.\par If can tolerate, can add Ethambutol 1200 mg po Mon/Wed/Fri next week.\par He does not want to use arikayce b/c already has hearing loss. Is willing to try clofazimine.\par Not sure rifampin is a good idea with his hydrocodone - can lower levels.  Can NOT use it with plavix.\par Sensitivity done and was sensitive to macrolides and amikacin. \par \par Reached out to hepatology to see how long until he can start treatment for Hep C in the past and has not been able to start yet. \par \par 2) Bronchiectasis:\par Should do good mucus clearing as per pulmonary\par \par 3) Hep C:\par Advised f/up with hepatology. \par \par 4) Side effects:\par Discuss side effects with pt and his wife at length.\par Given literature to review.\par Advised to see ophthal every 4 months while on ethambutol.\par No alcohol while on therapy\par \par For now will await to hear back from patient and hepatology.\par LFTs mildly elevated. Hopefully treatment for hep c normalizes this and then can re-try treatment for MAC.\par \par \par Time spent 35 minutes.

## 2023-02-13 NOTE — CONSULT LETTER
[Dear  ___] : Dear  [unfilled], [Consult Letter:] : I had the pleasure of evaluating your patient, [unfilled]. [Please see my note below.] : Please see my note below. [Consult Closing:] : Thank you very much for allowing me to participate in the care of this patient.  If you have any questions, please do not hesitate to contact me. [Sincerely,] : Sincerely, [DrKris  ___] : Dr. MONTAGUE [FreeTextEntry2] : Dr. Nikunj Hallman [FreeTextEntry3] : \par Josephine Steve MD\par  of Medicine\par Division of Infectious Diseases\par [facility] [employer] [facility] [employer] [facility]\par [address] [place] [address] DrKris\[place] [address]\par Tel: [phone]\par Fax: [phone]

## 2023-02-13 NOTE — HISTORY OF PRESENT ILLNESS
[FreeTextEntry1] : This is a 67 M, smoker, advanced emphysema, abnormal CT chest suggestive of bronchiectasis RUL, Hepatitis C, heart murmur, back surgery, chronic pain on long standing opiates, presents today for MAC and new changes on CT in Left Upper Lobe of  lung. \par \par He reports cough, produces phlegm.\par Losing weight. \par Feels very fatigued.\par Reports swallowing issues\par Denies fevers. \par \par Reports that he was initially found to have MAC in 2013. Treated at [place]. Given mac meds and after 1 day had to stop them. He could not tolerate he says. Since was seen at [facility] for rule out malignancy and saw ID there.  Was never treated again for MAC but was followed.  Not able to f/up there anymore as he does not have h/o malignancy.\par \par \par Presents with wife today.\par \par Works as \par Long time smoker.\par 2 kids\par 4 grandchildren - live in [place].\par Never treated for Hep C\par Labs from 2/2022 have normal LFTs. \par \par 3/28/22 Visit:\par Since last visit tried azithromycin 500 mg on MWF x 1 week. By Friday felt very unwell and had to stop medication. Could not even try next med, which was planned to be ethambutol.  \par Pt reports he feels better now off of meds.\par Will be traveling to [place] and return early May. \par Also shows me labs from Hep C and VL was high over 4 million. \par \par 6/20/22:\par Now returns.\par Restarted azithromycin about 2 weeks ago and taking every other day.  Upsets his stomach but seems to tolerate it.  Is to start treatment for Hep C.  We disccussed this at length.  \par \par 2/6/2023:\par Returns today after 7 months.\par Since last visit, developed heart failure.  Had swollen feet.\par Seen at [facility] and had mitral valve clip.  EF was 25% and after clip ~35% one month later.\par In January new echo just about 35%.\par To start cardiac rehab.  \par \par Hep C not treated yet. \par Medication was not able to be covered by insurance. Has new application to fill out.\par Needs to return to hepatologist to see if there are drug interactions.\par Returned today to update me on his new medical issues.\par \par No major new complaints regarding pulmonary status.+cough +mucus production\par \par

## 2023-03-10 ENCOUNTER — APPOINTMENT (OUTPATIENT)
Dept: PAIN MANAGEMENT | Facility: CLINIC | Age: 68
End: 2023-03-10
Payer: MEDICARE

## 2023-03-10 PROCEDURE — 99213 OFFICE O/P EST LOW 20 MIN: CPT | Mod: 95

## 2023-03-10 RX ORDER — HYDROCODONE BITARTRATE 10 MG/1
10 CAPSULE, EXTENDED RELEASE ORAL
Refills: 0 | Status: DISCONTINUED | COMMUNITY
Start: 2021-03-16 | End: 2023-03-10

## 2023-03-10 NOTE — HISTORY OF PRESENT ILLNESS
[Neck] : neck [Lower back] : lower back [10] : 10 [Burning] : burning [Dull/Aching] : dull/aching [Shooting] : shooting [Stabbing] : stabbing [Throbbing] : throbbing [Constant] : constant [Household chores] : household chores [Sleep] : sleep [Rest] : rest [Meds] : meds [Ice] : ice [Heat] : heat [Massage] : massage [Home] : at home, [unfilled] , at the time of the visit. [Medical Office: ([facility])___] : at the medical office located in  [Verbal consent obtained from patient] : the patient, [unfilled] [de-identified] : 03/10/2023 PT COMPLETED PHYSICAL THERAPY YEARS AGO WITH NO IMPROVEMENT. PT CURRENTLY DO HOMES STRETCHED ONCE A DAY EVERY DAY WHICH  HELP TEMPORALLY ALEVE WITH PAIN  [] : Post Surgical Visit: no [FreeTextEntry7] : KNEE  [de-identified] : ALL MOVMENT

## 2023-03-20 ENCOUNTER — APPOINTMENT (OUTPATIENT)
Dept: PAIN MANAGEMENT | Facility: CLINIC | Age: 68
End: 2023-03-20

## 2023-03-21 ENCOUNTER — APPOINTMENT (OUTPATIENT)
Dept: PAIN MANAGEMENT | Facility: CLINIC | Age: 68
End: 2023-03-21

## 2023-03-24 ENCOUNTER — APPOINTMENT (OUTPATIENT)
Dept: PAIN MANAGEMENT | Facility: CLINIC | Age: 68
End: 2023-03-24
Payer: MEDICARE

## 2023-03-24 PROCEDURE — 99213 OFFICE O/P EST LOW 20 MIN: CPT | Mod: 95

## 2023-03-24 NOTE — REASON FOR VISIT
[Follow-Up Visit] : a follow-up pain management visit [Home] : at home, [unfilled] , at the time of the visit. [Medical Office: ([facility])___] : at the medical office located in  [Patient] : the patient [Self] : self [FreeTextEntry2] : Back pain

## 2023-03-24 NOTE — DISCUSSION/SUMMARY
[Medication Risks Reviewed] : Medication risks reviewed [de-identified] : Prescriptions renewed. [place] and [place]. SOAPP-R completed on chart. The patient's medications are documented to the best of their ability. Quality of life and functional ability improved on medications. The patient is showing no aberrant behavior or evidence of diversion. The patient was advised not to use narcotic medication while operating an automobile or heavy machinery due to potential sedation or dizziness. The patient was educated to the risks associated with potential opioid dependence and addiction. Urine toxicology screens as per office protocol. Use of multimodal analgesia used prn.\par Follow up one month.

## 2023-03-24 NOTE — HISTORY OF PRESENT ILLNESS
[Neck] : neck [Lower back] : lower back [Gradual] : gradual [8] : 8 [6] : 6 [Dull/Aching] : dull/aching [Stabbing] : stabbing [Constant] : constant [Household chores] : household chores [Work] : work [Sleep] : sleep [Rest] : rest [Sitting] : sitting [Meds] : meds [Standing] : standing [Walking] : walking [Physical therapy] : physical therapy [Retired] : Work status: retired [] : no [FreeTextEntry1] : B/L KNEES

## 2023-04-19 ENCOUNTER — APPOINTMENT (OUTPATIENT)
Dept: PAIN MANAGEMENT | Facility: CLINIC | Age: 68
End: 2023-04-19
Payer: MEDICARE

## 2023-04-19 VITALS — BODY MASS INDEX: 20.14 KG/M2 | WEIGHT: 136 LBS | HEIGHT: 69 IN

## 2023-04-19 PROCEDURE — 99213 OFFICE O/P EST LOW 20 MIN: CPT | Mod: 95

## 2023-04-19 NOTE — REASON FOR VISIT
[Follow-Up Visit] : a follow-up pain management visit [Home] : at home, [unfilled] , at the time of the visit. [Medical Office: ([facility])___] : at the medical office located in  [Patient] : the patient [Self] : self [FreeTextEntry2] : Back pain

## 2023-04-19 NOTE — DISCUSSION/SUMMARY
[Medication Risks Reviewed] : Medication risks reviewed [de-identified] : Prescriptions renewed. [place] and [place]. SOAPP-R completed on chart. The patient's medications are documented to the best of their ability. Quality of life and functional ability improved on medications. The patient is showing no aberrant behavior or evidence of diversion. The patient was advised not to use narcotic medication while operating an automobile or heavy machinery due to potential sedation or dizziness. The patient was educated to the risks associated with potential opioid dependence and addiction. Urine toxicology screens as per office protocol. Use of multimodal analgesia used prn.\par Follow up one month.

## 2023-04-19 NOTE — HISTORY OF PRESENT ILLNESS
[Neck] : neck [Lower back] : lower back [Gradual] : gradual [8] : 8 [Dull/Aching] : dull/aching [Stabbing] : stabbing [Constant] : constant [Household chores] : household chores [Work] : work [Sleep] : sleep [Rest] : rest [Meds] : meds [Sitting] : sitting [Standing] : standing [Walking] : walking [Physical therapy] : physical therapy [Retired] : Work status: retired [] : no [FreeTextEntry1] : B/L KNEES  [de-identified] : 2023-04-18 [de-identified] : 2x week

## 2023-04-24 NOTE — PHYSICAL EXAM
[General Appearance - Alert] : alert [General Appearance - In No Acute Distress] : in no acute distress [Sclera] : the sclera and conjunctiva were normal [PERRL With Normal Accommodation] : pupils were equal in size, round, reactive to light [Outer Ear] : the ears and nose were normal in appearance [Neck Appearance] : the appearance of the neck was normal [Negative] : Heme/Lymph [Exaggerated Use Of Accessory Muscles For Inspiration] : no accessory muscle use [Heart Sounds] : normal S1 and S2 [Heart Rate And Rhythm] : heart rate was normal and rhythm regular [FreeTextEntry1] : +loud murmur [Skin Color & Pigmentation] : normal skin color and pigmentation [] : no rash [No Focal Deficits] : no focal deficits [Oriented To Time, Place, And Person] : oriented to person, place, and time [Affect] : the affect was normal

## 2023-05-16 ENCOUNTER — APPOINTMENT (OUTPATIENT)
Dept: PAIN MANAGEMENT | Facility: CLINIC | Age: 68
End: 2023-05-16
Payer: MEDICARE

## 2023-05-16 VITALS — WEIGHT: 144 LBS | BODY MASS INDEX: 21.33 KG/M2 | HEIGHT: 69 IN

## 2023-05-16 PROCEDURE — 99213 OFFICE O/P EST LOW 20 MIN: CPT

## 2023-05-16 NOTE — HISTORY OF PRESENT ILLNESS
[Neck] : neck [Lower back] : lower back [Gradual] : gradual [8] : 8 [Dull/Aching] : dull/aching [Stabbing] : stabbing [Constant] : constant [Household chores] : household chores [Work] : work [Sleep] : sleep [Rest] : rest [Meds] : meds [Sitting] : sitting [Standing] : standing [Walking] : walking [Physical therapy] : physical therapy [Retired] : Work status: retired [] : no [FreeTextEntry1] : B/L KNEES  [de-identified] : 2023-04-18 [de-identified] : 2x week

## 2023-05-16 NOTE — DISCUSSION/SUMMARY
[Medication Risks Reviewed] : Medication risks reviewed [de-identified] : Prescriptions renewed. [place] and [place]. SOAPP-R completed on chart. The patient's medications are documented to the best of their ability. Quality of life and functional ability improved on medications. The patient is showing no aberrant behavior or evidence of diversion. The patient was advised not to use narcotic medication while operating an automobile or heavy machinery due to potential sedation or dizziness. The patient was educated to the risks associated with potential opioid dependence and addiction. Urine toxicology screens as per office protocol. Use of multimodal analgesia used prn.\par Follow up one month.

## 2023-06-14 ENCOUNTER — APPOINTMENT (OUTPATIENT)
Dept: PAIN MANAGEMENT | Facility: CLINIC | Age: 68
End: 2023-06-14
Payer: MEDICARE

## 2023-06-14 VITALS — WEIGHT: 144 LBS | BODY MASS INDEX: 21.33 KG/M2 | HEIGHT: 69 IN

## 2023-06-14 PROCEDURE — 99213 OFFICE O/P EST LOW 20 MIN: CPT | Mod: 95

## 2023-06-14 RX ORDER — HYDROCODONE BITARTRATE AND ACETAMINOPHEN 10; 325 MG/1; MG/1
10-325 TABLET ORAL EVERY 4 HOURS
Qty: 30 | Refills: 0 | Status: DISCONTINUED | COMMUNITY
Start: 2023-02-10 | End: 2023-06-14

## 2023-06-14 RX ORDER — HYDROCODONE BITARTRATE AND ACETAMINOPHEN 10; 325 MG/1; MG/1
10-325 TABLET ORAL
Qty: 180 | Refills: 0 | Status: DISCONTINUED | COMMUNITY
Start: 2023-04-19 | End: 2023-06-14

## 2023-06-14 RX ORDER — HYDROCODONE BITARTRATE AND ACETAMINOPHEN 10; 325 MG/1; MG/1
10-325 TABLET ORAL
Qty: 180 | Refills: 0 | Status: DISCONTINUED | COMMUNITY
Start: 2023-03-24 | End: 2023-06-14

## 2023-06-14 NOTE — REASON FOR VISIT
[Home] : at home, [unfilled] , at the time of the visit. [Medical Office: ([facility])___] : at the medical office located in  [Patient] : the patient [Self] : self [FreeTextEntry2] : MED RENEWAL

## 2023-06-14 NOTE — HISTORY OF PRESENT ILLNESS
[Neck] : neck [Lower back] : lower back [Gradual] : gradual [8] : 8 [7] : 7 [Dull/Aching] : dull/aching [Sharp] : sharp [Stabbing] : stabbing [Constant] : constant [Household chores] : household chores [Work] : work [Sleep] : sleep [Rest] : rest [Meds] : meds [Nothing helps with pain getting better] : Nothing helps with pain getting better [Sitting] : sitting [Standing] : standing [Walking] : walking [Physical therapy] : physical therapy [Retired] : Work status: retired [de-identified] : Chronic back pain. States cardiac rehab  at [facility] very helpful with his breathing and heart issues. Meds effective prn. Some generics n=more effective than others.  [] : no [FreeTextEntry1] : B/L KNEES  [de-identified] : 2023-06-13 [de-identified] : 2x week

## 2023-07-12 ENCOUNTER — APPOINTMENT (OUTPATIENT)
Dept: PAIN MANAGEMENT | Facility: CLINIC | Age: 68
End: 2023-07-12
Payer: MEDICARE

## 2023-07-12 PROCEDURE — 99213 OFFICE O/P EST LOW 20 MIN: CPT | Mod: 95

## 2023-07-12 NOTE — HISTORY OF PRESENT ILLNESS
[Neck] : neck [Lower back] : lower back [Gradual] : gradual [8] : 8 [7] : 7 [Dull/Aching] : dull/aching [Sharp] : sharp [Stabbing] : stabbing [Constant] : constant [Household chores] : household chores [Work] : work [Sleep] : sleep [Rest] : rest [Meds] : meds [Nothing helps with pain getting better] : Nothing helps with pain getting better [Sitting] : sitting [Standing] : standing [Walking] : walking [Physical therapy] : physical therapy [Retired] : Work status: retired [] : no [FreeTextEntry1] : B/L KNEES  [de-identified] : 2023-06-13 [de-identified] : 2x week

## 2023-07-12 NOTE — REASON FOR VISIT
[Home] : at home, [unfilled] , at the time of the visit. [Medical Office: ([facility])___] : at the medical office located in  [Patient] : the patient [Self] : self [FreeTextEntry2] : med refill

## 2023-07-12 NOTE — DISCUSSION/SUMMARY
[Medication Risks Reviewed] : Medication risks reviewed [de-identified] : Prescriptions renewed. [place] and [place]. SOAPP-R completed on chart. The patient's medications are documented to the best of their ability. Quality of life and functional ability improved on medications. The patient is showing no aberrant behavior or evidence of diversion. The patient was advised not to use narcotic medication while operating an automobile or heavy machinery due to potential sedation or dizziness. The patient was educated to the risks associated with potential opioid dependence and addiction. Urine toxicology screens as per office protocol. Use of multimodal analgesia used prn.\par Follow up one month.\par

## 2023-08-08 ENCOUNTER — APPOINTMENT (OUTPATIENT)
Dept: PAIN MANAGEMENT | Facility: CLINIC | Age: 68
End: 2023-08-08
Payer: MEDICARE

## 2023-08-08 VITALS — BODY MASS INDEX: 21.33 KG/M2 | WEIGHT: 144 LBS | HEIGHT: 69 IN

## 2023-08-08 PROCEDURE — 99213 OFFICE O/P EST LOW 20 MIN: CPT

## 2023-08-08 NOTE — DISCUSSION/SUMMARY
[Medication Risks Reviewed] : Medication risks reviewed [de-identified] : Prescriptions renewed. [place] and [place]. SOAPP-R completed on chart. The patient's medications are documented to the best of their ability. Quality of life and functional ability improved on medications. The patient is showing no aberrant behavior or evidence of diversion. The patient was advised not to use narcotic medication while operating an automobile or heavy machinery due to potential sedation or dizziness. The patient was educated to the risks associated with potential opioid dependence and addiction. Urine toxicology screens as per office protocol. Use of multimodal analgesia used prn. Follow up one month.

## 2023-08-08 NOTE — HISTORY OF PRESENT ILLNESS
[Neck] : neck [Lower back] : lower back [Gradual] : gradual [10] : 10 [8] : 8 [Dull/Aching] : dull/aching [Radiating] : radiating [Sharp] : sharp [Shooting] : shooting [Stabbing] : stabbing [Constant] : constant [Household chores] : household chores [Work] : work [Sleep] : sleep [Rest] : rest [Meds] : meds [Nothing helps with pain getting better] : Nothing helps with pain getting better [Sitting] : sitting [Standing] : standing [Walking] : walking [Physical therapy] : physical therapy [Retired] : Work status: retired [de-identified] : Chronic back pain. States Norco most effective for him as adverse reactions to many other narcotics over the past years. Dealing with Pulmonary and GI issues. Remains as active as possible.  [] : no [FreeTextEntry1] : B/L KNEES  [FreeTextEntry7] : DOWN BOTH KNEES  [de-identified] : 2023-7-27 [de-identified] : 2x week

## 2023-09-08 ENCOUNTER — APPOINTMENT (OUTPATIENT)
Dept: PAIN MANAGEMENT | Facility: CLINIC | Age: 68
End: 2023-09-08
Payer: MEDICARE

## 2023-09-08 PROCEDURE — 99213 OFFICE O/P EST LOW 20 MIN: CPT | Mod: 95

## 2023-09-08 NOTE — DISCUSSION/SUMMARY
[Medication Risks Reviewed] : Medication risks reviewed [de-identified] : Prescriptions renewed. [place] and [place]. SOAPP-R completed on chart. The patient's medications are documented to the best of their ability. Quality of life and functional ability improved on medications. The patient is showing no aberrant behavior or evidence of diversion. The patient was advised not to use narcotic medication while operating an automobile or heavy machinery due to potential sedation or dizziness. The patient was educated to the risks associated with potential opioid dependence and addiction. Urine toxicology screens as per office protocol. Use of multimodal analgesia used prn. Follow up one month.

## 2023-09-08 NOTE — HISTORY OF PRESENT ILLNESS
[Neck] : neck [Lower back] : lower back [Gradual] : gradual [10] : 10 [8] : 8 [Dull/Aching] : dull/aching [Radiating] : radiating [Sharp] : sharp [Shooting] : shooting [Stabbing] : stabbing [Constant] : constant [Household chores] : household chores [Work] : work [Sleep] : sleep [Rest] : rest [Meds] : meds [Nothing helps with pain getting better] : Nothing helps with pain getting better [Sitting] : sitting [Standing] : standing [Walking] : walking [Physical therapy] : physical therapy [Retired] : Work status: retired [] : no [FreeTextEntry1] : B/L KNEES  [FreeTextEntry7] : DOWN BOTH KNEES  [de-identified] : 2x week

## 2023-09-08 NOTE — REASON FOR VISIT
[Follow-Up Visit] : a follow-up pain management visit [Home] : at home, [unfilled] , at the time of the visit. [Medical Office: ([facility])___] : at the medical office located in  [Patient] : the patient [Self] : self [FreeTextEntry2] : MED REFILL

## 2023-09-28 ENCOUNTER — OUTPATIENT (OUTPATIENT)
Dept: OUTPATIENT SERVICES | Facility: HOSPITAL | Age: 68
LOS: 1 days | End: 2023-09-28
Payer: MEDICARE

## 2023-09-28 ENCOUNTER — APPOINTMENT (OUTPATIENT)
Dept: CT IMAGING | Facility: CLINIC | Age: 68
End: 2023-09-28
Payer: MEDICARE

## 2023-09-28 DIAGNOSIS — R10.31 RIGHT LOWER QUADRANT PAIN: ICD-10-CM

## 2023-09-28 DIAGNOSIS — Z98.890 OTHER SPECIFIED POSTPROCEDURAL STATES: Chronic | ICD-10-CM

## 2023-09-28 DIAGNOSIS — Z00.8 ENCOUNTER FOR OTHER GENERAL EXAMINATION: ICD-10-CM

## 2023-09-28 DIAGNOSIS — K74.60 UNSPECIFIED CIRRHOSIS OF LIVER: ICD-10-CM

## 2023-09-28 PROCEDURE — 74177 CT ABD & PELVIS W/CONTRAST: CPT | Mod: 26,MH

## 2023-09-28 PROCEDURE — 74177 CT ABD & PELVIS W/CONTRAST: CPT

## 2023-10-09 ENCOUNTER — APPOINTMENT (OUTPATIENT)
Dept: PAIN MANAGEMENT | Facility: CLINIC | Age: 68
End: 2023-10-09
Payer: MEDICARE

## 2023-10-09 VITALS — WEIGHT: 144 LBS | BODY MASS INDEX: 21.33 KG/M2 | HEIGHT: 69 IN

## 2023-10-09 PROCEDURE — 99213 OFFICE O/P EST LOW 20 MIN: CPT | Mod: 95

## 2023-11-08 ENCOUNTER — APPOINTMENT (OUTPATIENT)
Dept: PAIN MANAGEMENT | Facility: CLINIC | Age: 68
End: 2023-11-08
Payer: MEDICARE

## 2023-11-08 VITALS — BODY MASS INDEX: 21.33 KG/M2 | HEIGHT: 69 IN | WEIGHT: 144 LBS

## 2023-11-08 PROCEDURE — 99214 OFFICE O/P EST MOD 30 MIN: CPT | Mod: 95

## 2023-11-08 RX ORDER — GABAPENTIN 300 MG/1
300 CAPSULE ORAL 3 TIMES DAILY
Qty: 90 | Refills: 3 | Status: ACTIVE | COMMUNITY
Start: 2023-11-08 | End: 1900-01-01

## 2023-12-06 ENCOUNTER — APPOINTMENT (OUTPATIENT)
Dept: PAIN MANAGEMENT | Facility: CLINIC | Age: 68
End: 2023-12-06
Payer: MEDICARE

## 2023-12-06 PROCEDURE — 99213 OFFICE O/P EST LOW 20 MIN: CPT | Mod: 95

## 2023-12-06 RX ORDER — HYDROCODONE BITARTRATE AND ACETAMINOPHEN 10; 325 MG/1; MG/1
10-325 TABLET ORAL
Qty: 225 | Refills: 0 | Status: DISCONTINUED | COMMUNITY
Start: 2023-05-16 | End: 2023-12-06

## 2024-01-05 ENCOUNTER — APPOINTMENT (OUTPATIENT)
Dept: PAIN MANAGEMENT | Facility: CLINIC | Age: 69
End: 2024-01-05
Payer: MEDICARE

## 2024-01-05 PROCEDURE — 99213 OFFICE O/P EST LOW 20 MIN: CPT

## 2024-01-05 NOTE — REASON FOR VISIT
[Follow-Up Visit] : a follow-up pain management visit [Home] : at home, [unfilled] , at the time of the visit. [Medical Office: ([facility])___] : at the medical office located in  [Patient] : the patient [Self] : self [FreeTextEntry2] : MED REFILL

## 2024-01-05 NOTE — HISTORY OF PRESENT ILLNESS
[Neck] : neck [Lower back] : lower back [Gradual] : gradual [8] : 8 [Dull/Aching] : dull/aching [Radiating] : radiating [Sharp] : sharp [Shooting] : shooting [Stabbing] : stabbing [Constant] : constant [Household chores] : household chores [Work] : work [Sleep] : sleep [Rest] : rest [Meds] : meds [Nothing helps with pain getting better] : Nothing helps with pain getting better [Sitting] : sitting [Standing] : standing [Walking] : walking [Physical therapy] : physical therapy [Retired] : Work status: retired [] : no [FreeTextEntry1] : B/L KNEES  [FreeTextEntry7] : DOWN BOTH KNEES , TO TOES NUMBNESS IN FEET PAYAL  [de-identified] : 2023-12-05 [de-identified] : HOME EXERXCISES AND STARTING REHAB 2023-11-08 GOING TO 2X WEEK

## 2024-01-05 NOTE — DISCUSSION/SUMMARY
[Medication Risks Reviewed] : Medication risks reviewed [de-identified] : Prescriptions renewed. [place] and [place]. SOAPP-R completed on chart. The patient's medications are documented to the best of their ability. Quality of life and functional ability improved on medications. The patient is showing no aberrant behavior or evidence of diversion. The patient was advised not to use narcotic medication while operating an automobile or heavy machinery due to potential sedation or dizziness. The patient was educated to the risks associated with potential opioid dependence and addiction. Urine toxicology screens as per office protocol. Use of multimodal analgesia used prn. Follow up one month.

## 2024-01-08 ENCOUNTER — APPOINTMENT (OUTPATIENT)
Dept: ORTHOPEDIC SURGERY | Facility: CLINIC | Age: 69
End: 2024-01-08
Payer: MEDICARE

## 2024-01-08 PROCEDURE — 99204 OFFICE O/P NEW MOD 45 MIN: CPT

## 2024-01-08 PROCEDURE — 99214 OFFICE O/P EST MOD 30 MIN: CPT

## 2024-01-08 PROCEDURE — 72050 X-RAY EXAM NECK SPINE 4/5VWS: CPT

## 2024-01-08 RX ORDER — CYCLOBENZAPRINE HYDROCHLORIDE 5 MG/1
5 TABLET, FILM COATED ORAL 3 TIMES DAILY
Qty: 30 | Refills: 0 | Status: ACTIVE | COMMUNITY
Start: 2024-01-08 | End: 1900-01-01

## 2024-01-30 ENCOUNTER — APPOINTMENT (OUTPATIENT)
Dept: PAIN MANAGEMENT | Facility: CLINIC | Age: 69
End: 2024-01-30
Payer: MEDICARE

## 2024-01-30 DIAGNOSIS — M54.2 CERVICALGIA: ICD-10-CM

## 2024-01-30 PROCEDURE — 99214 OFFICE O/P EST MOD 30 MIN: CPT

## 2024-01-30 RX ORDER — CYCLOBENZAPRINE HYDROCHLORIDE 10 MG/1
10 TABLET, FILM COATED ORAL
Qty: 90 | Refills: 0 | Status: ACTIVE | COMMUNITY
Start: 2024-01-30 | End: 1900-01-01

## 2024-01-30 NOTE — DISCUSSION/SUMMARY
[Medication Risks Reviewed] : Medication risks reviewed [de-identified] : Prescriptions renewed. [place] and [place]. SOAPP-R completed on chart. The patient's medications are documented to the best of their ability. Quality of life and functional ability improved on medications. The patient is showing no aberrant behavior or evidence of diversion. The patient was advised not to use narcotic medication while operating an automobile or heavy machinery due to potential sedation or dizziness. The patient was educated to the risks associated with potential opioid dependence and addiction. Urine toxicology screens as per office protocol. Use of multimodal analgesia used prn. Follow up one month.

## 2024-01-30 NOTE — HISTORY OF PRESENT ILLNESS
[Neck] : neck [Lower back] : lower back [Gradual] : gradual [7] : 7 [Dull/Aching] : dull/aching [Radiating] : radiating [Sharp] : sharp [Shooting] : shooting [Stabbing] : stabbing [Constant] : constant [Household chores] : household chores [Work] : work [Sleep] : sleep [Rest] : rest [Meds] : meds [Nothing helps with pain getting better] : Nothing helps with pain getting better [Sitting] : sitting [Standing] : standing [Walking] : walking [Physical therapy] : physical therapy [Retired] : Work status: retired [] : no [FreeTextEntry1] : B/L KNEES  [FreeTextEntry7] : DOWN BOTH KNEES , TO TOES NUMBNESS IN FEET PAYAL  [de-identified] : 2024-01-25 [de-identified] : HOME EXERXCISES AND STARTING REHAB 2023-11-08 GOING TO 2X WEEK

## 2024-02-12 RX ORDER — DICLOFENAC SODIUM AND MISOPROSTOL 75; 200 MG/1; UG/1
75-0.2 TABLET, DELAYED RELEASE ORAL
Qty: 180 | Refills: 0 | Status: ACTIVE | COMMUNITY
Start: 1900-01-01 | End: 1900-01-01

## 2024-02-26 ENCOUNTER — APPOINTMENT (OUTPATIENT)
Dept: ORTHOPEDIC SURGERY | Facility: CLINIC | Age: 69
End: 2024-02-26

## 2024-02-28 ENCOUNTER — APPOINTMENT (OUTPATIENT)
Dept: PAIN MANAGEMENT | Facility: CLINIC | Age: 69
End: 2024-02-28
Payer: MEDICARE

## 2024-02-28 DIAGNOSIS — G89.4 CHRONIC PAIN SYNDROME: ICD-10-CM

## 2024-02-28 PROCEDURE — 99213 OFFICE O/P EST LOW 20 MIN: CPT

## 2024-02-28 RX ORDER — NALOXONE HYDROCHLORIDE 4 MG/.1ML
4 SPRAY NASAL
Qty: 1 | Refills: 0 | Status: ACTIVE | COMMUNITY
Start: 2023-08-08 | End: 1900-01-01

## 2024-02-28 NOTE — HISTORY OF PRESENT ILLNESS
[Neck] : neck [Lower back] : lower back [Gradual] : gradual [7] : 7 [Dull/Aching] : dull/aching [Radiating] : radiating [Sharp] : sharp [Shooting] : shooting [Stabbing] : stabbing [Constant] : constant [Household chores] : household chores [Work] : work [Sleep] : sleep [Rest] : rest [Meds] : meds [Nothing helps with pain getting better] : Nothing helps with pain getting better [Sitting] : sitting [Standing] : standing [Walking] : walking [Physical therapy] : physical therapy [Retired] : Work status: retired [] : no [FreeTextEntry1] : B/L KNEES  [de-identified] : 2024-01-25 [FreeTextEntry7] : DOWN BOTH KNEES , TO TOES NUMBNESS IN FEET PAYAL  [de-identified] : HOME EXERXCISES AND STARTING REHAB 2023-11-08 GOING TO 2X WEEK

## 2024-02-28 NOTE — DISCUSSION/SUMMARY
[Medication Risks Reviewed] : Medication risks reviewed [de-identified] : Prescriptions renewed for 21 days. They are planning to. travel to [place] for [facility]. [place] and [place]. SOAPP-R completed on chart. The patient's medications are documented to the best of their ability. Quality of life and functional ability improved on medications. The patient is showing no aberrant behavior or evidence of diversion. The patient was advised not to use narcotic medication while operating an automobile or heavy machinery due to potential sedation or dizziness. The patient was educated to the risks associated with potential opioid dependence and addiction. Urine toxicology screens as per office protocol. Use of multimodal analgesia used prn. Follow up one month.

## 2024-02-28 NOTE — REASON FOR VISIT
[Follow-Up Visit] : a follow-up pain management visit [Home] : at home, [unfilled] , at the time of the visit. [Medical Office: ([facility])___] : at the medical office located in  [Patient] : the patient [Self] : self [FreeTextEntry2] : MED REFILL

## 2024-03-18 ENCOUNTER — APPOINTMENT (OUTPATIENT)
Dept: PAIN MANAGEMENT | Facility: CLINIC | Age: 69
End: 2024-03-18
Payer: MEDICARE

## 2024-03-18 PROCEDURE — 99214 OFFICE O/P EST MOD 30 MIN: CPT

## 2024-03-18 NOTE — DISCUSSION/SUMMARY
[Medication Risks Reviewed] : Medication risks reviewed [de-identified] : Prescriptions renewed. [place] and [place]. SOAPP-R completed on chart. The patient's medications are documented to the best of their ability. Quality of life and functional ability improved on medications. The patient is showing no aberrant behavior or evidence of diversion. The patient was advised not to use narcotic medication while operating an automobile or heavy machinery due to potential sedation or dizziness. The patient was educated to the risks associated with potential opioid dependence and addiction. Urine toxicology screens as per office protocol. Use of multimodal analgesia used prn. Follow up one month.

## 2024-03-18 NOTE — REASON FOR VISIT
[Follow-Up Visit] : a follow-up pain management visit [Home] : at home, [unfilled] , at the time of the visit. [Medical Office: ([facility])___] : at the medical office located in  [Patient] : the patient [Self] : self

## 2024-03-18 NOTE — HISTORY OF PRESENT ILLNESS
[Neck] : neck [Mid-back] : mid-back [Lower back] : lower back [8] : 8 [Dull/Aching] : dull/aching [Shooting] : shooting [Stabbing] : stabbing [Constant] : constant [Ice] : ice [Meds] : meds [Heat] : heat [Retired] : Work status: retired [] : no [de-identified] : JEANNA [FreeTextEntry1] : WHOLE BODY [de-identified] : 2024 [de-identified] : YEARS AGO [de-identified] : CHEMICAL REACTION - SWEATS AND PSYCHOTIC BEHAVIOR

## 2024-03-19 ENCOUNTER — APPOINTMENT (OUTPATIENT)
Dept: INFECTIOUS DISEASE | Facility: CLINIC | Age: 69
End: 2024-03-19
Payer: MEDICARE

## 2024-03-19 PROCEDURE — 99443: CPT | Mod: 93

## 2024-03-21 ENCOUNTER — APPOINTMENT (OUTPATIENT)
Dept: ULTRASOUND IMAGING | Facility: CLINIC | Age: 69
End: 2024-03-21

## 2024-03-24 LAB
BACTERIA SPT CULT: NORMAL
BACTERIA SPT CULT: NORMAL

## 2024-03-24 NOTE — ASSESSMENT
[FreeTextEntry1] : This is a 68 M, smoker, advanced emphysema, abnormal CT chest suggestive of bronchiectasis RUL, Hepatitis C, heart murmur, back surgery, chronic pain on long standing opiates, presents today for MAC and new changes on CT in Left Upper Lobe of  lung.   1) MAC: Long standing infection since 2013 based on history. I do not have prior culture data from outside hospitals. Never treated before. Agree pt does meet criteria for treatment and with positive cultures and changes on CT chest, is reasonable to attempt treatment. I am concerned that pt will not be able to tolerate due to intolerability and risks of hepatotoxicity.  He has Hep C and it was felt better to treat this first, then start treatment for MAC.    He was on plavix and entresto  but this has since been stopped.    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 Will get results and CT Chest result too.  Need to speak to GI prior to treatment.   Sensitivity done and was sensitive to macrolides and amikacin.    2) Bronchiectasis: Should do good mucus clearing as per pulmonary  3) Hep C: Advised f/up with hepatology.   Time spent 35 minutes.

## 2024-03-24 NOTE — PHYSICAL EXAM
[General Appearance - Alert] : alert [General Appearance - In No Acute Distress] : in no acute distress [Sclera] : the sclera and conjunctiva were normal [PERRL With Normal Accommodation] : pupils were equal in size, round, reactive to light [Neck Appearance] : the appearance of the neck was normal [Outer Ear] : the ears and nose were normal in appearance [Exaggerated Use Of Accessory Muscles For Inspiration] : no accessory muscle use [Heart Sounds] : normal S1 and S2 [Heart Rate And Rhythm] : heart rate was normal and rhythm regular [] : no rash [Skin Color & Pigmentation] : normal skin color and pigmentation [No Focal Deficits] : no focal deficits [Oriented To Time, Place, And Person] : oriented to person, place, and time [Affect] : the affect was normal [FreeTextEntry1] : +loud murmur in past

## 2024-03-24 NOTE — HISTORY OF PRESENT ILLNESS
[Home] : at home, [unfilled] , at the time of the visit. [Medical Office: ([facility])___] : at the medical office located in  [Verbal consent obtained from patient] : the patient, [unfilled] [FreeTextEntry1] : This is a 68 M, smoker, advanced emphysema, abnormal CT chest suggestive of bronchiectasis RUL, Hepatitis C, heart murmur, back surgery, chronic pain on long standing opiates, presents today for MAC and new changes on CT in Left Upper Lobe of  lung.   Last seen 2/2023  He reports cough, produces phlegm. Losing weight.  Feels very fatigued. Denies fevers.   Reports that he was initially found to have MAC in 2013. Treated at [facility]. Given mac meds and after 1 day had to stop them. He could not tolerate he says. Since was seen at [facility] for rule out malignancy and saw ID there.  Was never treated again for MAC but was followed.  Not able to f/up there anymore as he does not have h/o malignancy.  Presents with wife today on telephone.  Works as  Long time smoker. 2 kids 4 grandchildren - live in [place]. Never treated for Hep C Labs from 2/2022 have normal LFTs.   3/28/22 Visit: Since last visit tried azithromycin 500 mg on MWF x 1 week. By Friday felt very unwell and had to stop medication. Could not even try next med, which was planned to be ethambutol.   Pt reports he feels better now off of meds. Will be traveling to [place] and return early May.  Also shows me labs from Hep C and VL was high over 4 million.   6/20/22: Now returns. Restarted azithromycin about 2 weeks ago and taking every other day.  Upsets his stomach but seems to tolerate it.  Is to start treatment for Hep C.  We disccussed this at length.    2/6/2023: Returns today after 7 months. Since last visit, developed heart failure.  Had swollen feet. Seen at [facility] and had mitral valve clip.  EF was 25% and after clip ~35% one month later. In January new echo just about 35%. To start cardiac rehab.    Hep C not treated yet.  Medication was not able to be covered by insurance. Has new application to fill out. Needs to return to hepatologist to see if there are drug interactions. Returned today to update me on his new medical issues.  No major new complaints regarding pulmonary status.+cough +mucus production  3/19/24: Been back to pulmonary Scanned records to chart Now growing M. Abscessus.  Need to repeat sputum again  Tells me he should be starting Hep C treatment in next few weeks. This will be helpful b/c i can speak to hepatology and hopefully treat MAC or NTM after this.  Meds can be hepatotoxic. He informs me he has cirrhosis now.  Ongoing CHF also. MV clip 1.5 year ago and now with leak.  Quit smoking 2 years ago. No ETOH. Wt is up and down based on chf. Not on oxygen.

## 2024-03-24 NOTE — CONSULT LETTER
[Dear  ___] : Dear  [unfilled], [Consult Letter:] : I had the pleasure of evaluating your patient, [unfilled]. [Please see my note below.] : Please see my note below. [Consult Closing:] : Thank you very much for allowing me to participate in the care of this patient.  If you have any questions, please do not hesitate to contact me. [Sincerely,] : Sincerely, [DrKris  ___] : Dr. MONTAGUE [FreeTextEntry2] : Dr. César Erazomid [FreeTextEntry3] : \par  Josephine Steve MD\par   of Medicine\par  Division of Infectious Diseases\par  [facility] [employer] [facility] [employer] [facility]\par  [address] [place] [address] DrKris\[place] [address]\par  Tel: [phone]\par  Fax: [phone] [DrKris ___] : Dr. MONTAGUE [___] : [unfilled]

## 2024-04-15 ENCOUNTER — APPOINTMENT (OUTPATIENT)
Dept: PAIN MANAGEMENT | Facility: CLINIC | Age: 69
End: 2024-04-15
Payer: MEDICARE

## 2024-04-15 PROCEDURE — 99213 OFFICE O/P EST LOW 20 MIN: CPT

## 2024-04-15 NOTE — REASON FOR VISIT
[Follow-Up Visit] : a follow-up pain management visit [Home] : at home, [unfilled] , at the time of the visit. [Medical Office: ([facility])___] : at the medical office located in  [Patient] : the patient [Self] : self

## 2024-04-15 NOTE — HISTORY OF PRESENT ILLNESS
[Neck] : neck [Lower back] : lower back [Gradual] : gradual [8] : 8 [7] : 7 [Dull/Aching] : dull/aching [Sharp] : sharp [Stabbing] : stabbing [Constant] : constant [Household chores] : household chores [Work] : work [Sleep] : sleep [Rest] : rest [Meds] : meds [Nothing helps with pain getting better] : Nothing helps with pain getting better [Sitting] : sitting [Standing] : standing [Walking] : walking [Physical therapy] : physical therapy [Retired] : Work status: retired [] : no [FreeTextEntry1] : B/L KNEES  [de-identified] : 2023-06-13 [de-identified] : 2x week

## 2024-04-15 NOTE — DISCUSSION/SUMMARY
[Medication Risks Reviewed] : Medication risks reviewed [de-identified] : Prescriptions renewed. [place] and [place]. SOAPP-R completed on chart. The patient's medications are documented to the best of their ability. Quality of life and functional ability improved on medications. The patient is showing no aberrant behavior or evidence of diversion. The patient was advised not to use narcotic medication while operating an automobile or heavy machinery due to potential sedation or dizziness. The patient was educated to the risks associated with potential opioid dependence and addiction. Urine toxicology screens as per office protocol. Use of multimodal analgesia used prn. Follow up one month.

## 2024-05-09 LAB — ACID FAST STN SPT: ABNORMAL

## 2024-05-14 ENCOUNTER — APPOINTMENT (OUTPATIENT)
Dept: PAIN MANAGEMENT | Facility: CLINIC | Age: 69
End: 2024-05-14
Payer: MEDICARE

## 2024-05-14 VITALS — BODY MASS INDEX: 21.33 KG/M2 | WEIGHT: 144 LBS | HEIGHT: 69 IN

## 2024-05-14 PROCEDURE — 99213 OFFICE O/P EST LOW 20 MIN: CPT

## 2024-05-14 NOTE — HISTORY OF PRESENT ILLNESS
[Neck] : neck [Lower back] : lower back [Gradual] : gradual [8] : 8 [7] : 7 [Dull/Aching] : dull/aching [Sharp] : sharp [Stabbing] : stabbing [Constant] : constant [Household chores] : household chores [Work] : work [Sleep] : sleep [Rest] : rest [Meds] : meds [Nothing helps with pain getting better] : Nothing helps with pain getting better [Sitting] : sitting [Standing] : standing [Walking] : walking [Physical therapy] : physical therapy [Retired] : Work status: retired [] : no [FreeTextEntry1] : B/L KNEES  [de-identified] : 2023-06-13 [de-identified] : 2x week

## 2024-05-14 NOTE — DISCUSSION/SUMMARY
[Medication Risks Reviewed] : Medication risks reviewed [de-identified] : Prescriptions renewed. [place] and [place]. SOAPP-R completed on chart. The patient's medications are documented to the best of their ability. Quality of life and functional ability improved on medications. The patient is showing no aberrant behavior or evidence of diversion. The patient was advised not to use narcotic medication while operating an automobile or heavy machinery due to potential sedation or dizziness. The patient was educated to the risks associated with potential opioid dependence and addiction. Urine toxicology screens as per office protocol. Use of multimodal analgesia used prn. Follow up one month.

## 2024-05-15 ENCOUNTER — EMERGENCY (EMERGENCY)
Facility: HOSPITAL | Age: 69
LOS: 1 days | Discharge: HOME | End: 2024-05-15
Attending: EMERGENCY MEDICINE | Admitting: EMERGENCY MEDICINE
Payer: MEDICARE

## 2024-05-15 VITALS
SYSTOLIC BLOOD PRESSURE: 117 MMHG | TEMPERATURE: 98 F | WEIGHT: 158.95 LBS | HEART RATE: 86 BPM | DIASTOLIC BLOOD PRESSURE: 73 MMHG | HEIGHT: 69 IN | OXYGEN SATURATION: 98 % | RESPIRATION RATE: 18 BRPM

## 2024-05-15 VITALS
TEMPERATURE: 98 F | DIASTOLIC BLOOD PRESSURE: 79 MMHG | SYSTOLIC BLOOD PRESSURE: 125 MMHG | RESPIRATION RATE: 19 BRPM | HEART RATE: 79 BPM | OXYGEN SATURATION: 97 %

## 2024-05-15 DIAGNOSIS — Z98.890 OTHER SPECIFIED POSTPROCEDURAL STATES: Chronic | ICD-10-CM

## 2024-05-15 DIAGNOSIS — A41.59 OTHER GRAM-NEGATIVE SEPSIS: Chronic | ICD-10-CM

## 2024-05-15 PROCEDURE — 99283 EMERGENCY DEPT VISIT LOW MDM: CPT

## 2024-05-15 PROCEDURE — 99282 EMERGENCY DEPT VISIT SF MDM: CPT

## 2024-05-15 NOTE — ED ADULT NURSE NOTE - OBJECTIVE STATEMENT
Pt. received alert and oriented x3 with chief complaint of while scratching back of right leg, vein started to bleed.

## 2024-05-15 NOTE — ED PROVIDER NOTE - PATIENT PORTAL LINK FT
You can access the FollowMyHealth Patient Portal offered by [facility] by registering at the following website: [email or web address]. By joining [employer]’s FollowMyHealth portal, you will also be able to view your health information using other applications (apps) compatible with our system.

## 2024-05-15 NOTE — ED ADULT NURSE NOTE - NS ED PATIENT SAFETY CONCERN
Increase furosemide to 40 mg ( 2 tablets ) twice daily until you lose 4 lbs then resume one tablet twice daily. From that point on, Weigh daily and double your furosemide dose  for 3-4 lb weight gain over a 2-3 day period or 5 pounds in a week until the weight has resolved then return to original dose. Repeat as necessary.  Mediterranean Diet recommended with carbohydrate and sodium ( salt )restriction.  
No

## 2024-05-15 NOTE — ED PROVIDER NOTE - OBJECTIVE STATEMENT
67yo male with bleeding varicose vein, started yesterday, pt put a dressing on it and states it would not stop bleeding, no pain no other complaints

## 2024-05-15 NOTE — ED ADULT NURSE NOTE - CAS TRG GEN SKIN CONDITION
No new care gaps identified.  [facility] Embedded Care Gaps. Reference number: [id number]. 7/25/2022   1:10:44 PM CDT  
Warm

## 2024-05-15 NOTE — ED ADULT TRIAGE NOTE - CHIEF COMPLAINT QUOTE
pt states that he scratched the posterior side of his right thigh and it started bleeding uncontrollably. pt states he takes a baby asp a day

## 2024-06-04 ENCOUNTER — APPOINTMENT (OUTPATIENT)
Dept: INFECTIOUS DISEASE | Facility: CLINIC | Age: 69
End: 2024-06-04
Payer: MEDICARE

## 2024-06-04 PROCEDURE — 99443: CPT | Mod: 93

## 2024-06-04 NOTE — HISTORY OF PRESENT ILLNESS
[Home] : at home, [unfilled] , at the time of the visit. [Medical Office: ([facility])___] : at the medical office located in  [Spouse] : spouse [Verbal consent obtained from patient] : the patient, [unfilled] [FreeTextEntry1] : This is a 68 M, smoker, advanced emphysema, abnormal CT chest suggestive of bronchiectasis RUL, Hepatitis C, heart murmur, back surgery, chronic pain on long standing opiates, presents today for MAC and new changes on CT in Left Upper Lobe of  lung.   Last seen 2/2023  He reports cough, produces phlegm. Losing weight.  Feels very fatigued. Denies fevers.   Reports that he was initially found to have MAC in 2013. Treated at [facility]. Given mac meds and after 1 day had to stop them. He could not tolerate he says. Since was seen at [facility] for rule out malignancy and saw ID there.  Was never treated again for MAC but was followed.  Not able to f/up there anymore as he does not have h/o malignancy.  Presents with wife today on telephone.  Works as  Long time smoker. 2 kids 4 grandchildren - live in [place]. Never treated for Hep C Labs from 2/2022 have normal LFTs.   3/28/22 Visit: Since last visit tried azithromycin 500 mg on MWF x 1 week. By Friday felt very unwell and had to stop medication. Could not even try next med, which was planned to be ethambutol.   Pt reports he feels better now off of meds. Will be traveling to [place] and return early May.  Also shows me labs from Hep C and VL was high over 4 million.   6/20/22: Now returns. Restarted azithromycin about 2 weeks ago and taking every other day.  Upsets his stomach but seems to tolerate it.  Is to start treatment for Hep C.  We disccussed this at length.    2/6/2023: Returns today after 7 months. Since last visit, developed heart failure.  Had swollen feet. Seen at [facility] and had mitral valve clip.  EF was 25% and after clip ~35% one month later. In January new echo just about 35%. To start cardiac rehab.    Hep C not treated yet.  Medication was not able to be covered by insurance. Has new application to fill out. Needs to return to hepatologist to see if there are drug interactions. Returned today to update me on his new medical issues.  No major new complaints regarding pulmonary status.+cough +mucus production  3/19/24: Been back to pulmonary Scanned records to chart Now growing M. Abscessus.  Need to repeat sputum again  Tells me he should be starting Hep C treatment in next few weeks. This will be helpful b/c i can speak to hepatology and hopefully treat MAC or NTM after this.  Meds can be hepatotoxic. He informs me he has cirrhosis now.  Ongoing CHF also. MV clip 1.5 year ago and now with leak.  Quit smoking 2 years ago. No ETOH. Wt is up and down based on chf. Not on oxygen.  6/4/24: Met with pt and wife on the phone. Overall no change in status.  Still SOB, coughs at times with phlegm.  Gained 15 lbs 160 lbs now due tofluid overload. Still awaiting Hep c treatment. Told he should get hospital admission for diuresis but he does not want it b/c doesn't want to be in hospital  Pt has M. abscessus in culture - interesting has truncated erm gene and can USE the macrolide for treatment here. Not macrolide resistant.    Called and spoke to pulm Dr. Juarez - ct chest 2/2024 stable done at [facility].  bronchiectasis w/ secretions noted.  Decreased consolidaiton WES. 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  This was compared to 7/11/2022.   Called and left messages for heart failure MD and GI.

## 2024-06-04 NOTE — ASSESSMENT
[FreeTextEntry1] : This is a 68 M, smoker, advanced emphysema, abnormal CT chest suggestive of bronchiectasis RUL, Hepatitis C, heart murmur, back surgery, chronic pain on long standing opiates, presents today for MAC and new changes on CT in Left Upper Lobe of  lung.   1) MAC: Long standing infection since 2013 based on history. I do not have prior culture data from outside hospitals. Never treated before. Agree pt does meet criteria for treatment and with positive cultures and changes on CT chest, is reasonable to attempt treatment. I am concerned that pt will not be able to tolerate due to intolerability and risks of hepatotoxicity.  He has Hep C and it was felt better to treat this first, then start treatment for MAC.    He was on plavix and entresto  but this has since been stopped.    New cultures with ***M abscessus also from pulmonary. Will get results and CT Chest result too. Spoke to pulm about these results. Will fax me report.  Need to speak to GI prior to treatment.  Left message. Called chf team also to get more info.    Sensitivity done for MAC and was sensitive to macrolides and amikacin.  Has truncated erm gene for M abscessus. Can use macrolide.  For now given ct chest is stable from 2022 i will hold off on antibiotics. I do hope he can get the Hep c treatment soon.  2) Bronchiectasis: Should do good mucus clearing as per pulmonary  3) Hep C: Advised f/up with hepatology.   Time spent 35 minutes.  Called pt twice and doctors as well.

## 2024-06-04 NOTE — PHYSICAL EXAM
[General Appearance - Alert] : alert [General Appearance - In No Acute Distress] : in no acute distress [Sclera] : the sclera and conjunctiva were normal [PERRL With Normal Accommodation] : pupils were equal in size, round, reactive to light [] : no respiratory distress [FreeTextEntry1] : +loud murmur in past  [Oriented To Time, Place, And Person] : oriented to person, place, and time [Affect] : the affect was normal

## 2024-06-04 NOTE — CONSULT LETTER
Confirmed 2 patient identifiers. Administered__injection  per physicians order to _left deltoid. Patient tolerated well, NADN. Advised to remain in lobby for 15 minutes post injection to monitor for adverse reactions. Patient verbalized understanding.      [Dear  ___] : Dear  [unfilled], [Consult Letter:] : I had the pleasure of evaluating your patient, [unfilled]. [Please see my note below.] : Please see my note below. [Consult Closing:] : Thank you very much for allowing me to participate in the care of this patient.  If you have any questions, please do not hesitate to contact me. [Sincerely,] : Sincerely, [FreeTextEntry2] : Dr. César Erazomid [FreeTextEntry3] : \par  Josephine Steve MD\par   of Medicine\par  Division of Infectious Diseases\par  [facility] [employer] [facility] [employer] [facility] at [facility]\par  [address] [place] [address] DrKris\[place] [address]\par  Tel: [phone]\par  Fax: [phone] [DrKris  ___] : Dr. MONTAGUE [DrKris ___] : Dr. MONTAGUE [___] : [unfilled]

## 2024-06-14 ENCOUNTER — APPOINTMENT (OUTPATIENT)
Dept: PAIN MANAGEMENT | Facility: CLINIC | Age: 69
End: 2024-06-14
Payer: MEDICARE

## 2024-06-14 DIAGNOSIS — M96.1 POSTLAMINECTOMY SYNDROME, NOT ELSEWHERE CLASSIFIED: ICD-10-CM

## 2024-06-14 DIAGNOSIS — M54.16 RADICULOPATHY, LUMBAR REGION: ICD-10-CM

## 2024-06-14 PROCEDURE — 99214 OFFICE O/P EST MOD 30 MIN: CPT

## 2024-06-14 RX ORDER — HYDROCODONE BITARTRATE AND ACETAMINOPHEN 10; 325 MG/1; MG/1
10-325 TABLET ORAL
Qty: 180 | Refills: 0 | Status: ACTIVE | COMMUNITY
Start: 2024-01-05 | End: 1900-01-01

## 2024-06-14 NOTE — DISCUSSION/SUMMARY
[Medication Risks Reviewed] : Medication risks reviewed [de-identified] : Prescriptions renewed. [place] and [place]. SOAPP-R completed on chart. The patient's medications are documented to the best of their ability. Quality of life and functional ability improved on medications. The patient is showing no aberrant behavior or evidence of diversion. The patient was advised not to use narcotic medication while operating an automobile or heavy machinery due to potential sedation or dizziness. The patient was educated to the risks associated with potential opioid dependence and addiction. Urine toxicology screens as per office protocol. Use of multimodal analgesia used prn. Follow up one month.

## 2024-06-14 NOTE — REASON FOR VISIT
[Follow-Up Visit] : a follow-up pain management visit [Home] : at home, [unfilled] , at the time of the visit. [Medical Office: ([facility])___] : at the medical office located in  [Patient] : the patient [Self] : self

## 2024-06-14 NOTE — HISTORY OF PRESENT ILLNESS
[Neck] : neck [Lower back] : lower back [Gradual] : gradual [8] : 8 [Dull/Aching] : dull/aching [Sharp] : sharp [Stabbing] : stabbing [Constant] : constant [Household chores] : household chores [Work] : work [Sleep] : sleep [Rest] : rest [Meds] : meds [Nothing helps with pain getting better] : Nothing helps with pain getting better [Sitting] : sitting [Standing] : standing [Walking] : walking [Physical therapy] : physical therapy [Retired] : Work status: retired [] : no [FreeTextEntry1] : B/L KNEES  [de-identified] : 2023-06-13 [de-identified] : 2x week

## 2024-06-28 LAB — ACID FAST STN SPT: ABNORMAL

## 2024-07-10 ENCOUNTER — APPOINTMENT (OUTPATIENT)
Dept: PAIN MANAGEMENT | Facility: CLINIC | Age: 69
End: 2024-07-10
Payer: MEDICARE

## 2024-07-10 DIAGNOSIS — M96.1 POSTLAMINECTOMY SYNDROME, NOT ELSEWHERE CLASSIFIED: ICD-10-CM

## 2024-07-10 DIAGNOSIS — M54.16 RADICULOPATHY, LUMBAR REGION: ICD-10-CM

## 2024-07-10 PROCEDURE — 99214 OFFICE O/P EST MOD 30 MIN: CPT

## 2024-07-26 ENCOUNTER — APPOINTMENT (OUTPATIENT)
Dept: ORTHOPEDIC SURGERY | Facility: CLINIC | Age: 69
End: 2024-07-26

## 2024-07-26 ENCOUNTER — APPOINTMENT (OUTPATIENT)
Dept: PAIN MANAGEMENT | Facility: CLINIC | Age: 69
End: 2024-07-26
Payer: MEDICARE

## 2024-07-26 VITALS — HEIGHT: 69 IN | WEIGHT: 144 LBS | BODY MASS INDEX: 21.33 KG/M2

## 2024-07-26 DIAGNOSIS — M65.311 TRIGGER THUMB, RIGHT THUMB: ICD-10-CM

## 2024-07-26 DIAGNOSIS — G89.4 CHRONIC PAIN SYNDROME: ICD-10-CM

## 2024-07-26 DIAGNOSIS — M54.16 RADICULOPATHY, LUMBAR REGION: ICD-10-CM

## 2024-07-26 DIAGNOSIS — M96.1 POSTLAMINECTOMY SYNDROME, NOT ELSEWHERE CLASSIFIED: ICD-10-CM

## 2024-07-26 PROCEDURE — 20550 NJX 1 TENDON SHEATH/LIGAMENT: CPT | Mod: RT

## 2024-07-26 PROCEDURE — 99203 OFFICE O/P NEW LOW 30 MIN: CPT | Mod: 25

## 2024-07-26 PROCEDURE — 99214 OFFICE O/P EST MOD 30 MIN: CPT

## 2024-07-26 RX ORDER — PREDNISONE 10 MG/1
10 TABLET ORAL
Refills: 0 | Status: ACTIVE | COMMUNITY

## 2024-07-26 NOTE — DISCUSSION/SUMMARY
[Medication Risks Reviewed] : Medication risks reviewed [de-identified] : No controlled substances written today.  reviewed and. picked up last script 7-. Encouraged to. try the Prednisone and contact his Pulmonologist with any issues.  Follow up scheduled.

## 2024-07-26 NOTE — REASON FOR VISIT
[Follow-Up Visit] : a follow-up pain management visit [Home] : at home, [unfilled] , at the time of the visit. [Medical Office: ([facility])___] : at the medical office located in  [Patient] : the patient [Self] : self

## 2024-07-26 NOTE — PROCEDURE
[FreeTextEntry3] : Tendon sheath was performed of the right thumb trigger finger. The indication for this procedure was pain and inflammation. The site was prepped with alcohol, ethyl chloride sprayed topically, and sterile technique used. An injection of Lidocaine 0.5cc of 1%, Triamcinolone (Kenalog) 0.5cc of 10 mg was used. Patient tolerated procedure well.   The risks, benefits, and alternatives have been discussed, and verbal consent was obtained.   The risks, benefits, alternatives, and contents of the injection have been discussed.  Risks include but are not limited to allergic reaction, flare reaction, bruising, injection site pain, numbness, increased blood sugar, permanent white skin discoloration at the injection site and infection.  The patient understands the risks and agrees to having the injection.  All questions have been answered. Patient agrees to injection.

## 2024-07-26 NOTE — HISTORY OF PRESENT ILLNESS
[Neck] : neck [Mid-back] : mid-back [Lower back] : lower back [8] : 8 [Dull/Aching] : dull/aching [Shooting] : shooting [Stabbing] : stabbing [Constant] : constant [Meds] : meds [Ice] : ice [Heat] : heat [Retired] : Work status: retired [] : no [FreeTextEntry1] : WHOLE BODY [de-identified] : JEANNA [de-identified] : 2024 [de-identified] : YEARS AGO [de-identified] : CHEMICAL REACTION - SWEATS AND PSYCHOTIC BEHAVIOR

## 2024-07-26 NOTE — DISCUSSION/SUMMARY
Pt called via  to let her know of her estrogen level looked good and to follow her calendar regarding FET medication. Pt verbalizes understanding via .  Pt declines any additional questions.    [de-identified] : Discussed the nature of the diagnosis and risk and benefits of different modalities of treatment. RT trigger thumb  Discussed conservative management as symptoms demand vs CSI. Pt would like a CSI. Done today and tolerated well. All questions answered.

## 2024-07-26 NOTE — HISTORY OF PRESENT ILLNESS
[de-identified] : 68 year old male presenting with RT thumb pain and locking for the past 2 months. No injury/trauma.   [FreeTextEntry4] : 2 months= [FreeTextEntry1] : RT thumb [FreeTextEntry5] : RT thumb

## 2024-08-12 ENCOUNTER — APPOINTMENT (OUTPATIENT)
Dept: PAIN MANAGEMENT | Facility: CLINIC | Age: 69
End: 2024-08-12
Payer: MEDICARE

## 2024-08-12 DIAGNOSIS — M96.1 POSTLAMINECTOMY SYNDROME, NOT ELSEWHERE CLASSIFIED: ICD-10-CM

## 2024-08-12 PROCEDURE — 99213 OFFICE O/P EST LOW 20 MIN: CPT

## 2024-08-12 NOTE — DISCUSSION/SUMMARY
[Medication Risks Reviewed] : Medication risks reviewed [de-identified] : Prescriptions renewed for three weeks as traveling to [place]. in September.  [place]  reviewed and [place]. SOAPP-R completed on chart. The patient's medications are documented to the best of their ability. Quality of life and functional ability improved on medications. The patient is showing no aberrant behavior or evidence of diversion. The patient was advised not to use narcotic medication while operating an automobile or heavy machinery due to potential sedation or dizziness. The patient was educated to the risks associated with potential opioid dependence and addiction. Urine toxicology screens as per office protocol. Use of multimodal analgesia used prn. Follow up three weeks.

## 2024-08-12 NOTE — REASON FOR VISIT
[Home] : at home, [unfilled] , at the time of the visit. [Medical Office: ([facility])___] : at the medical office located in  [Patient] : the patient [Self] : self

## 2024-08-12 NOTE — HISTORY OF PRESENT ILLNESS
[Neck] : neck [Lower back] : lower back [Gradual] : gradual [8] : 8 [7] : 7 [Dull/Aching] : dull/aching [Sharp] : sharp [Stabbing] : stabbing [Constant] : constant [Household chores] : household chores [Work] : work [Sleep] : sleep [Rest] : rest [Meds] : meds [Nothing helps with pain getting better] : Nothing helps with pain getting better [Sitting] : sitting [Standing] : standing [Walking] : walking [Physical therapy] : physical therapy [Retired] : Work status: retired [] : no [FreeTextEntry1] : B/L KNEES  [de-identified] : 2023-06-13 [de-identified] : 2x week

## 2024-09-03 ENCOUNTER — APPOINTMENT (OUTPATIENT)
Dept: PAIN MANAGEMENT | Facility: CLINIC | Age: 69
End: 2024-09-03
Payer: MEDICARE

## 2024-09-03 VITALS — BODY MASS INDEX: 22.51 KG/M2 | HEIGHT: 69 IN | WEIGHT: 152 LBS

## 2024-09-03 DIAGNOSIS — M54.16 RADICULOPATHY, LUMBAR REGION: ICD-10-CM

## 2024-09-03 DIAGNOSIS — M96.1 POSTLAMINECTOMY SYNDROME, NOT ELSEWHERE CLASSIFIED: ICD-10-CM

## 2024-09-03 PROCEDURE — 99213 OFFICE O/P EST LOW 20 MIN: CPT

## 2024-09-03 NOTE — DISCUSSION/SUMMARY
Identified pt with two pt identifiers(name and ).   Chief Complaint   Patient presents with    Immunization/Injection     prolia shot        Pt tolerated shot well [Medication Risks Reviewed] : Medication risks reviewed [de-identified] : Prescriptions renewed. [place] and [place]. SOAPP-R completed on chart. The patient's medications are documented to the best of their ability. Quality of life and functional ability improved on medications. The patient is showing no aberrant behavior or evidence of diversion. The patient was advised not to use narcotic medication while operating an automobile or heavy machinery due to potential sedation or dizziness. The patient was educated to the risks associated with potential opioid dependence and addiction. Urine toxicology screens as per office protocol. Use of multimodal analgesia used prn. Follow up one month.

## 2024-09-03 NOTE — HISTORY OF PRESENT ILLNESS
[Neck] : neck [Lower back] : lower back [Gradual] : gradual [8] : 8 [6] : 6 [Dull/Aching] : dull/aching [Sharp] : sharp [Stabbing] : stabbing [Constant] : constant [Household chores] : household chores [Work] : work [Sleep] : sleep [Rest] : rest [Meds] : meds [Nothing helps with pain getting better] : Nothing helps with pain getting better [Sitting] : sitting [Standing] : standing [Walking] : walking [Physical therapy] : physical therapy [Retired] : Work status: retired [] : no [FreeTextEntry1] : B/L KNEES  [de-identified] : 2023-06-13 [de-identified] : 2x week

## 2024-09-16 ENCOUNTER — OUTPATIENT (OUTPATIENT)
Dept: OUTPATIENT SERVICES | Facility: HOSPITAL | Age: 69
LOS: 1 days | End: 2024-09-16
Payer: MEDICARE

## 2024-09-16 ENCOUNTER — APPOINTMENT (OUTPATIENT)
Dept: CT IMAGING | Facility: CLINIC | Age: 69
End: 2024-09-16
Payer: MEDICARE

## 2024-09-16 DIAGNOSIS — Z00.00 ENCOUNTER FOR GENERAL ADULT MEDICAL EXAMINATION WITHOUT ABNORMAL FINDINGS: ICD-10-CM

## 2024-09-16 DIAGNOSIS — Z98.890 OTHER SPECIFIED POSTPROCEDURAL STATES: Chronic | ICD-10-CM

## 2024-09-16 DIAGNOSIS — A41.59 OTHER GRAM-NEGATIVE SEPSIS: Chronic | ICD-10-CM

## 2024-09-16 PROCEDURE — 71250 CT THORAX DX C-: CPT | Mod: MH

## 2024-09-16 PROCEDURE — 71250 CT THORAX DX C-: CPT | Mod: 26,MH

## 2024-09-20 ENCOUNTER — NON-APPOINTMENT (OUTPATIENT)
Age: 69
End: 2024-09-20

## 2024-09-30 ENCOUNTER — APPOINTMENT (OUTPATIENT)
Dept: PAIN MANAGEMENT | Facility: CLINIC | Age: 69
End: 2024-09-30
Payer: MEDICARE

## 2024-09-30 DIAGNOSIS — M54.16 RADICULOPATHY, LUMBAR REGION: ICD-10-CM

## 2024-09-30 DIAGNOSIS — M96.1 POSTLAMINECTOMY SYNDROME, NOT ELSEWHERE CLASSIFIED: ICD-10-CM

## 2024-09-30 PROCEDURE — 99212 OFFICE O/P EST SF 10 MIN: CPT

## 2024-09-30 NOTE — DISCUSSION/SUMMARY
[Medication Risks Reviewed] : Medication risks reviewed [de-identified] : Prescriptions renewed. [place] and [place]. SOAPP-R completed on chart. The patient's medications are documented to the best of their ability. Quality of life and functional ability improved on medications. The patient is showing no aberrant behavior or evidence of diversion. The patient was advised not to use narcotic medication while operating an automobile or heavy machinery due to potential sedation or dizziness. The patient was educated to the risks associated with potential opioid dependence and addiction. Urine toxicology screens as per office protocol. Use of multimodal analgesia used prn. Follow up one month.

## 2024-09-30 NOTE — HISTORY OF PRESENT ILLNESS
[Neck] : neck [Lower back] : lower back [Gradual] : gradual [8] : 8 [6] : 6 [Dull/Aching] : dull/aching [Sharp] : sharp [Stabbing] : stabbing [Constant] : constant [Household chores] : household chores [Work] : work [Sleep] : sleep [Rest] : rest [Meds] : meds [Nothing helps with pain getting better] : Nothing helps with pain getting better [Sitting] : sitting [Standing] : standing [Walking] : walking [Physical therapy] : physical therapy [Retired] : Work status: retired [] : no [FreeTextEntry1] : B/L KNEES  [de-identified] : 2023-06-13 [de-identified] : 2x week

## 2024-09-30 NOTE — REASON FOR VISIT
[Home] : at home, [unfilled] , at the time of the visit. [Medical Office: ([facility])___] : at the medical office located in  [Patient] : the patient [Self] : self [Follow-Up Visit] : a follow-up pain management visit

## 2024-10-19 NOTE — DISCUSSION/SUMMARY
[Medication Risks Reviewed] : Medication risks reviewed [de-identified] : Prescriptions renewed. [place] and [place]. SOAPP-R completed on chart. The patient's medications are documented to the best of their ability. Quality of life and functional ability improved on medications. The patient is showing no aberrant behavior or evidence of diversion. The patient was advised not to use narcotic medication while operating an automobile or heavy machinery due to potential sedation or dizziness. The patient was educated to the risks associated with potential opioid dependence and addiction. Urine toxicology screens as per office protocol. Use of multimodal analgesia used prn.\par Follow up one month.  No

## 2024-10-24 ENCOUNTER — APPOINTMENT (OUTPATIENT)
Dept: PULMONOLOGY | Facility: CLINIC | Age: 69
End: 2024-10-24
Payer: SELF-PAY

## 2024-10-24 ENCOUNTER — APPOINTMENT (OUTPATIENT)
Dept: PULMONOLOGY | Facility: CLINIC | Age: 69
End: 2024-10-24
Payer: MEDICARE

## 2024-10-24 ENCOUNTER — NON-APPOINTMENT (OUTPATIENT)
Age: 69
End: 2024-10-24

## 2024-10-24 VITALS
SYSTOLIC BLOOD PRESSURE: 112 MMHG | OXYGEN SATURATION: 92 % | WEIGHT: 151 LBS | TEMPERATURE: 97.4 F | HEART RATE: 98 BPM | HEIGHT: 69 IN | DIASTOLIC BLOOD PRESSURE: 65 MMHG | BODY MASS INDEX: 22.36 KG/M2 | RESPIRATION RATE: 16 BRPM

## 2024-10-24 DIAGNOSIS — J47.9 BRONCHIECTASIS, UNCOMPLICATED: ICD-10-CM

## 2024-10-24 DIAGNOSIS — B19.20 UNSPECIFIED VIRAL HEPATITIS C W/OUT HEPATIC COMA: ICD-10-CM

## 2024-10-24 DIAGNOSIS — A31.0 PULMONARY MYCOBACTERIAL INFECTION: ICD-10-CM

## 2024-10-24 DIAGNOSIS — R76.8 OTHER SPECIFIED ABNORMAL IMMUNOLOGICAL FINDINGS IN SERUM: ICD-10-CM

## 2024-10-24 DIAGNOSIS — K74.02 HEPATIC FIBROSIS, ADVANCED FIBROSIS: ICD-10-CM

## 2024-10-24 DIAGNOSIS — J38.00 PARALYSIS OF VOCAL CORDS AND LARYNX, UNSPECIFIED: ICD-10-CM

## 2024-10-24 DIAGNOSIS — R05.9 COUGH, UNSPECIFIED: ICD-10-CM

## 2024-10-24 DIAGNOSIS — J44.9 CHRONIC OBSTRUCTIVE PULMONARY DISEASE, UNSPECIFIED: ICD-10-CM

## 2024-10-24 LAB
A1AT SERPL-MCNC: 247 MG/DL
NT-PROBNP SERPL-MCNC: 5350 PG/ML
RHEUMATOID FACT SER QL: <10 IU/ML

## 2024-10-24 PROCEDURE — 36415 COLL VENOUS BLD VENIPUNCTURE: CPT

## 2024-10-24 PROCEDURE — 99215 OFFICE O/P EST HI 40 MIN: CPT

## 2024-10-24 PROCEDURE — ZZZZZ: CPT

## 2024-10-24 PROCEDURE — 94799 UNLISTED PULMONARY SVC/PX: CPT

## 2024-10-24 RX ORDER — UMECLIDINIUM BROMIDE AND VILANTEROL TRIFENATATE 62.5; 25 UG/1; UG/1
62.5-25 POWDER RESPIRATORY (INHALATION) DAILY
Qty: 1 | Refills: 11 | Status: ACTIVE | COMMUNITY
Start: 2024-10-24 | End: 1900-01-01

## 2024-10-24 RX ORDER — ALBUTEROL SULFATE 2.5 MG/3ML
(2.5 MG/3ML) SOLUTION RESPIRATORY (INHALATION) TWICE DAILY
Qty: 60 | Refills: 11 | Status: ACTIVE | COMMUNITY
Start: 2024-10-24 | End: 1900-01-01

## 2024-10-25 ENCOUNTER — LABORATORY RESULT (OUTPATIENT)
Age: 69
End: 2024-10-25

## 2024-10-25 LAB
A ALTERNATA IGE QN: <0.1 KUA/L
A FUMIGATUS IGE QN: <0.1 KUA/L
ALBUMIN SERPL ELPH-MCNC: 4.2 G/DL
ALP BLD-CCNC: 207 U/L
ALT SERPL-CCNC: 11 U/L
ANION GAP SERPL CALC-SCNC: 19 MMOL/L
AST SERPL-CCNC: 25 U/L
BASOPHILS # BLD AUTO: 0.08 K/UL
BASOPHILS NFR BLD AUTO: 0.7 %
BILIRUB SERPL-MCNC: 0.9 MG/DL
BUN SERPL-MCNC: 15 MG/DL
C ALBICANS IGE QN: <0.1 KUA/L
C HERBARUM IGE QN: <0.1 KUA/L
CALCIUM SERPL-MCNC: 9.5 MG/DL
CAT DANDER IGE QN: <0.1 KUA/L
CHLORIDE SERPL-SCNC: 96 MMOL/L
CO2 SERPL-SCNC: 25 MMOL/L
CREAT SERPL-MCNC: 1.22 MG/DL
CRP SERPL-MCNC: 9 MG/L
D FARINAE IGE QN: <0.1 KUA/L
D PTERONYSS IGE QN: <0.1 KUA/L
DEPRECATED A ALTERNATA IGE RAST QL: 0
DEPRECATED A FUMIGATUS IGE RAST QL: 0
DEPRECATED C ALBICANS IGE RAST QL: 0
DEPRECATED C HERBARUM IGE RAST QL: 0
DEPRECATED CAT DANDER IGE RAST QL: 0
DEPRECATED COMMON RAGWEED IGE RAST QL: 0
DEPRECATED D FARINAE IGE RAST QL: 0
DEPRECATED D PTERONYSS IGE RAST QL: 0
DEPRECATED DOG DANDER IGE RAST QL: 0
DEPRECATED KAPPA LC FREE/LAMBDA SER: 1.47 RATIO
DEPRECATED M RACEMOSUS IGE RAST QL: 0
DEPRECATED ROACH IGE RAST QL: 0
DEPRECATED TIMOTHY IGE RAST QL: 0
DEPRECATED WHITE OAK IGE RAST QL: 0
DOG DANDER IGE QN: <0.1 KUA/L
EGFR: 63 ML/MIN/1.73M2
EOSINOPHIL # BLD AUTO: 0.59 K/UL
EOSINOPHIL NFR BLD AUTO: 5 %
ERYTHROCYTE [SEDIMENTATION RATE] IN BLOOD BY WESTERGREN METHOD: 80 MM/HR
GLUCOSE SERPL-MCNC: 108 MG/DL
HCT VFR BLD CALC: 35.6 %
HGB BLD-MCNC: 10 G/DL
IGA SER QL IEP: 308 MG/DL
IGG SER QL IEP: 1326 MG/DL
IGG SER QL IEP: 1326 MG/DL
IGG1 SER-MCNC: 713 MG/DL
IGG2 SER-MCNC: 417 MG/DL
IGG3 SER-MCNC: 154.5 MG/DL
IGG4 SER-MCNC: 8.1 MG/DL
IGM SER QL IEP: 256 MG/DL
IMM GRANULOCYTES NFR BLD AUTO: 0.4 %
KAPPA LC CSF-MCNC: 2.4 MG/DL
KAPPA LC SERPL-MCNC: 3.53 MG/DL
LYMPHOCYTES # BLD AUTO: 2.3 K/UL
LYMPHOCYTES NFR BLD AUTO: 19.7 %
M RACEMOSUS IGE QN: <0.1 KUA/L
MAN DIFF?: NORMAL
MCHC RBC-ENTMCNC: 22 PG
MCHC RBC-ENTMCNC: 28.1 GM/DL
MCV RBC AUTO: 78.4 FL
MONOCYTES # BLD AUTO: 1.4 K/UL
MONOCYTES NFR BLD AUTO: 12 %
NEUTROPHILS # BLD AUTO: 7.27 K/UL
NEUTROPHILS NFR BLD AUTO: 62.2 %
PLATELET # BLD AUTO: 283 K/UL
POTASSIUM SERPL-SCNC: 4.5 MMOL/L
PROT SERPL-MCNC: 7.8 G/DL
RBC # BLD: 4.54 M/UL
RBC # FLD: 18 %
ROACH IGE QN: <0.1 KUA/L
SODIUM SERPL-SCNC: 141 MMOL/L
TIMOTHY IGE QN: <0.1 KUA/L
TOTAL IGE SMQN RAST: 39 KU/L
WBC # FLD AUTO: 11.69 K/UL
WHITE OAK IGE QN: <0.1 KUA/L

## 2024-10-28 ENCOUNTER — LABORATORY RESULT (OUTPATIENT)
Age: 69
End: 2024-10-28

## 2024-10-28 ENCOUNTER — NON-APPOINTMENT (OUTPATIENT)
Age: 69
End: 2024-10-28

## 2024-10-28 PROBLEM — J47.9 BRONCHIECTASIS WITHOUT COMPLICATION: Status: ACTIVE | Noted: 2024-10-28

## 2024-10-28 LAB
ALTERN TENCAPG(M6): 5.3 MCG/ML
ANA PAT FLD IF-IMP: ABNORMAL
ANA SER IF-ACNC: ABNORMAL
ASPER FUMCAPG(M3): 18.5 MCG/ML
AUREOBASCAPG(M12): 9.8 MCG/ML
COMMON RAGWEED IGE QN: <0.1 KUA/L
LACEYELLA SACCHARI IGG: 12.5 MCG/ML
M TB IFN-G BLD-IMP: NEGATIVE
MICROPOLYCAPG(M22): <2 MCG/ML
PENIC CHRYCAPG(M1): 12.4 MCG/ML
PHOMA BETAE IGG: 6.4 MCG/ML
QUANTIFERON TB PLUS MITOGEN MINUS NIL: >10 IU/ML
QUANTIFERON TB PLUS NIL: 0.02 IU/ML
QUANTIFERON TB PLUS TB1 MINUS NIL: 0 IU/ML
QUANTIFERON TB PLUS TB2 MINUS NIL: 0 IU/ML
TRICHODERMA VIRIDE IGG: 3.9 MCG/ML

## 2024-10-28 RX ORDER — SODIUM CHLORIDE FOR INHALATION 3.5 %
3.5 VIAL, NEBULIZER (ML) INHALATION TWICE DAILY
Qty: 1 | Refills: 2 | Status: ACTIVE | COMMUNITY
Start: 2024-10-24 | End: 1900-01-01

## 2024-10-29 LAB
A FLAVUS AB FLD QL: NEGATIVE
A FUMIGATUS AB FLD QL: NEGATIVE
A NIGER AB FLD QL: NEGATIVE

## 2024-10-30 ENCOUNTER — APPOINTMENT (OUTPATIENT)
Dept: PAIN MANAGEMENT | Facility: CLINIC | Age: 69
End: 2024-10-30
Payer: MEDICARE

## 2024-10-30 DIAGNOSIS — M96.1 POSTLAMINECTOMY SYNDROME, NOT ELSEWHERE CLASSIFIED: ICD-10-CM

## 2024-10-30 DIAGNOSIS — M54.16 RADICULOPATHY, LUMBAR REGION: ICD-10-CM

## 2024-10-30 LAB
A1AT PHENOTYP SERPL-IMP: NORMAL
A1AT SERPL-MCNC: 251 MG/DL
BACTERIA SPT CULT: NORMAL

## 2024-10-30 PROCEDURE — 99212 OFFICE O/P EST SF 10 MIN: CPT

## 2024-11-02 LAB — ACID FAST STN SPT: ABNORMAL

## 2024-11-05 ENCOUNTER — APPOINTMENT (OUTPATIENT)
Dept: PULMONOLOGY | Facility: CLINIC | Age: 69
End: 2024-11-05

## 2024-11-05 ENCOUNTER — APPOINTMENT (OUTPATIENT)
Age: 69
End: 2024-11-05
Payer: MEDICARE

## 2024-11-05 ENCOUNTER — NON-APPOINTMENT (OUTPATIENT)
Age: 69
End: 2024-11-05

## 2024-11-05 DIAGNOSIS — R76.8 OTHER SPECIFIED ABNORMAL IMMUNOLOGICAL FINDINGS IN SERUM: ICD-10-CM

## 2024-11-05 PROCEDURE — 99443: CPT | Mod: 93

## 2024-11-05 RX ORDER — LEVALBUTEROL HYDROCHLORIDE 0.63 MG/3ML
0.63 SOLUTION RESPIRATORY (INHALATION) TWICE DAILY
Qty: 30 | Refills: 11 | Status: ACTIVE | COMMUNITY
Start: 2024-11-05 | End: 1900-01-01

## 2024-11-05 RX ORDER — SODIUM CHLORIDE SOLN NEBU 7% 7 %
7 NEBU SOLN INHALATION
Qty: 30 | Refills: 0 | Status: ACTIVE | COMMUNITY
Start: 2024-11-05 | End: 1900-01-01

## 2024-11-09 ENCOUNTER — NON-APPOINTMENT (OUTPATIENT)
Age: 69
End: 2024-11-09

## 2024-11-11 ENCOUNTER — EMERGENCY (EMERGENCY)
Facility: HOSPITAL | Age: 69
LOS: 1 days | Discharge: HOME | End: 2024-11-11
Attending: STUDENT IN AN ORGANIZED HEALTH CARE EDUCATION/TRAINING PROGRAM
Payer: MEDICARE

## 2024-11-11 VITALS
SYSTOLIC BLOOD PRESSURE: 138 MMHG | TEMPERATURE: 98 F | HEART RATE: 82 BPM | OXYGEN SATURATION: 99 % | RESPIRATION RATE: 18 BRPM | DIASTOLIC BLOOD PRESSURE: 79 MMHG

## 2024-11-11 VITALS
OXYGEN SATURATION: 98 % | SYSTOLIC BLOOD PRESSURE: 149 MMHG | WEIGHT: 154.1 LBS | RESPIRATION RATE: 20 BRPM | HEIGHT: 69 IN | TEMPERATURE: 98 F | DIASTOLIC BLOOD PRESSURE: 94 MMHG | HEART RATE: 123 BPM

## 2024-11-11 DIAGNOSIS — A41.59 OTHER GRAM-NEGATIVE SEPSIS: Chronic | ICD-10-CM

## 2024-11-11 DIAGNOSIS — Z98.890 OTHER SPECIFIED POSTPROCEDURAL STATES: Chronic | ICD-10-CM

## 2024-11-11 LAB
ALBUMIN SERPL ELPH-MCNC: 3.4 G/DL — SIGNIFICANT CHANGE UP (ref 3.3–5)
ALP SERPL-CCNC: 149 U/L — HIGH (ref 40–120)
ALT FLD-CCNC: 19 U/L — SIGNIFICANT CHANGE UP (ref 10–45)
ANION GAP SERPL CALC-SCNC: 16 MMOL/L — SIGNIFICANT CHANGE UP (ref 5–17)
APPEARANCE UR: CLEAR — SIGNIFICANT CHANGE UP
AST SERPL-CCNC: 60 U/L — HIGH (ref 10–40)
BASOPHILS # BLD AUTO: 0.05 K/UL — SIGNIFICANT CHANGE UP (ref 0–0.2)
BASOPHILS NFR BLD AUTO: 0.5 % — SIGNIFICANT CHANGE UP (ref 0–2)
BILIRUB SERPL-MCNC: 0.7 MG/DL — SIGNIFICANT CHANGE UP (ref 0.2–1.2)
BILIRUB UR-MCNC: NEGATIVE — SIGNIFICANT CHANGE UP
BUN SERPL-MCNC: 16 MG/DL — SIGNIFICANT CHANGE UP (ref 7–23)
CALCIUM SERPL-MCNC: 9 MG/DL — SIGNIFICANT CHANGE UP (ref 8.4–10.5)
CHLORIDE SERPL-SCNC: 100 MMOL/L — SIGNIFICANT CHANGE UP (ref 96–108)
CO2 SERPL-SCNC: 21 MMOL/L — LOW (ref 22–31)
COLOR SPEC: YELLOW — SIGNIFICANT CHANGE UP
CREAT SERPL-MCNC: 1.1 MG/DL — SIGNIFICANT CHANGE UP (ref 0.5–1.3)
DIFF PNL FLD: NEGATIVE — SIGNIFICANT CHANGE UP
EGFR: 72 ML/MIN/1.73M2 — SIGNIFICANT CHANGE UP
EOSINOPHIL # BLD AUTO: 0.43 K/UL — SIGNIFICANT CHANGE UP (ref 0–0.5)
EOSINOPHIL NFR BLD AUTO: 4 % — SIGNIFICANT CHANGE UP (ref 0–6)
GAS PNL BLDV: SIGNIFICANT CHANGE UP
GLUCOSE SERPL-MCNC: 120 MG/DL — HIGH (ref 70–99)
GLUCOSE UR QL: NEGATIVE MG/DL — SIGNIFICANT CHANGE UP
HCT VFR BLD CALC: 28.8 % — LOW (ref 39–50)
HGB BLD-MCNC: 8.1 G/DL — LOW (ref 13–17)
IMM GRANULOCYTES NFR BLD AUTO: 0.6 % — SIGNIFICANT CHANGE UP (ref 0–0.9)
KETONES UR-MCNC: NEGATIVE MG/DL — SIGNIFICANT CHANGE UP
LEUKOCYTE ESTERASE UR-ACNC: NEGATIVE — SIGNIFICANT CHANGE UP
LYMPHOCYTES # BLD AUTO: 1.9 K/UL — SIGNIFICANT CHANGE UP (ref 1–3.3)
LYMPHOCYTES # BLD AUTO: 17.9 % — SIGNIFICANT CHANGE UP (ref 13–44)
MAGNESIUM SERPL-MCNC: 1.6 MG/DL — SIGNIFICANT CHANGE UP (ref 1.6–2.6)
MCHC RBC-ENTMCNC: 21.1 PG — LOW (ref 27–34)
MCHC RBC-ENTMCNC: 28.1 G/DL — LOW (ref 32–36)
MCV RBC AUTO: 75 FL — LOW (ref 80–100)
MONOCYTES # BLD AUTO: 0.94 K/UL — HIGH (ref 0–0.9)
MONOCYTES NFR BLD AUTO: 8.8 % — SIGNIFICANT CHANGE UP (ref 2–14)
NEUTROPHILS # BLD AUTO: 7.25 K/UL — SIGNIFICANT CHANGE UP (ref 1.8–7.4)
NEUTROPHILS NFR BLD AUTO: 68.2 % — SIGNIFICANT CHANGE UP (ref 43–77)
NITRITE UR-MCNC: NEGATIVE — SIGNIFICANT CHANGE UP
NRBC # BLD: 0 /100 WBCS — SIGNIFICANT CHANGE UP (ref 0–0)
NT-PROBNP SERPL-SCNC: 8983 PG/ML — HIGH (ref 0–300)
PH UR: 6 — SIGNIFICANT CHANGE UP (ref 5–8)
PHOSPHATE SERPL-MCNC: 3.2 MG/DL — SIGNIFICANT CHANGE UP (ref 2.5–4.5)
PLATELET # BLD AUTO: 285 K/UL — SIGNIFICANT CHANGE UP (ref 150–400)
POTASSIUM SERPL-MCNC: 3.8 MMOL/L — SIGNIFICANT CHANGE UP (ref 3.5–5.3)
POTASSIUM SERPL-SCNC: 3.8 MMOL/L — SIGNIFICANT CHANGE UP (ref 3.5–5.3)
PROT SERPL-MCNC: 7.2 G/DL — SIGNIFICANT CHANGE UP (ref 6–8.3)
PROT UR-MCNC: NEGATIVE MG/DL — SIGNIFICANT CHANGE UP
RBC # BLD: 3.84 M/UL — LOW (ref 4.2–5.8)
RBC # FLD: 17.2 % — HIGH (ref 10.3–14.5)
SODIUM SERPL-SCNC: 137 MMOL/L — SIGNIFICANT CHANGE UP (ref 135–145)
SP GR SPEC: >1.03 — HIGH (ref 1–1.03)
UROBILINOGEN FLD QL: 0.2 MG/DL — SIGNIFICANT CHANGE UP (ref 0.2–1)
WBC # BLD: 10.63 K/UL — HIGH (ref 3.8–10.5)
WBC # FLD AUTO: 10.63 K/UL — HIGH (ref 3.8–10.5)

## 2024-11-11 PROCEDURE — 74177 CT ABD & PELVIS W/CONTRAST: CPT | Mod: 26,MC

## 2024-11-11 PROCEDURE — 93010 ELECTROCARDIOGRAM REPORT: CPT

## 2024-11-11 PROCEDURE — 99285 EMERGENCY DEPT VISIT HI MDM: CPT | Mod: GC

## 2024-11-11 PROCEDURE — 71045 X-RAY EXAM CHEST 1 VIEW: CPT | Mod: 26

## 2024-11-11 RX ORDER — OXYCODONE HYDROCHLORIDE 30 MG/1
10 TABLET ORAL ONCE
Refills: 0 | Status: DISCONTINUED | OUTPATIENT
Start: 2024-11-11 | End: 2024-11-11

## 2024-11-11 RX ORDER — SODIUM CHLORIDE 9 MG/ML
4 INJECTION, SOLUTION INTRAMUSCULAR; INTRAVENOUS; SUBCUTANEOUS ONCE
Refills: 0 | Status: COMPLETED | OUTPATIENT
Start: 2024-11-11 | End: 2024-11-11

## 2024-11-11 RX ADMIN — SODIUM CHLORIDE 4 MILLILITER(S): 9 INJECTION, SOLUTION INTRAMUSCULAR; INTRAVENOUS; SUBCUTANEOUS at 04:30

## 2024-11-11 RX ADMIN — OXYCODONE HYDROCHLORIDE 10 MILLIGRAM(S): 30 TABLET ORAL at 02:49

## 2024-11-11 NOTE — ED PROVIDER NOTE - PHYSICAL EXAMINATION
Constitutional: Well nourished, well developed, appears stated age.   Eyes: PERRL, EOMI. No scleral icterus  HENT: Moist mucous membranes. No posterior oropharyngeal erythema.   Neck: Supple. No goiter.   CV: tachy to 100's upon exam, no m/r/g. Well perfused extremities.   RESP: Lungs CTAB, normal respiratory effort.  Speaking in full sentences.  Fine crackles at bases bilaterally.  GI: Soft, There is some minimal right lower quadrant tenderness.  MSK: Moving all four extremities. No obvious deformity  Skin: Warm, dry, no rashes  Neuro: Alert and oriented. Normal strength and sensation of UEs and LEs  Psych: Appropriate mood and affect

## 2024-11-11 NOTE — ED ADULT NURSE NOTE - OBJECTIVE STATEMENT
69y M Ester x4 came  in for SOB. Patient reports that he has a PMH of COPD/emphysema and is having worsening episodes of dyspnea on exertion as well as prolonged periods of SOB. Patient also reports that due to this he is having difficulty speaking, intermittent episodes of chest pain, abdominal pain, and a prolonged cough. Patient reports using his albuterol inhaler at home with no relief of symptoms. Patient states he is having difficulty coughing up his sputum, causing him to have worsening symptoms. PMH of hep c, GERD, and leaking mitral valve. Patient's wife is present at bedside. Patient on continuous cardiac monitor. Call bell within reach, bed in lowest position.

## 2024-11-11 NOTE — ED PROVIDER NOTE - OBJECTIVE STATEMENT
Per outpatient pulm note on 11/9/24    Recommend:  #Bronchiectasis/ Pulmonary NTM (prior MAC, more recent M. abscessus)  - stop Trelegy (was not using regularly)  - stop Budesonide nebs to avoid ICS with active NTM infection  - start Airway clearance with Albuterol and Hypersal 7% once to twice daily  - purchase Aerobika device through PFT lab today  - send sputum for routine culture and AFB monthly surveillance to assess burden  - labs today for bronchiectasis workup  #COPD/Emphysema  - start Anoro ellipta or alternative LABA/LAMA   - PFTs scheduled 11/5/24  - EOT with PFTs to assess for hypoxia/need for suppl O2 with exertion  - will likely benefit from Pulmonary rehab, can discuss further at next visit            #Dysphagia  - at follow-up with GI should discuss dysphagia symptoms, at risk for aspiration  - may benefit from Barium swallow and/or manometry eval  - continue PPI daily  #Vocal cord paresis  - referral to ENT as may be contributing to aspiration and BARRIOS  - may benefit from repeat laryngoscopy to determine if problem persists and if intervention could be   warranted/attempted  #Hep C cirrhosis  - completing antiviral treatment within 2 weeks  - important part of risk assessment for abx under consideration for NTM Patient is a 69-year-old male with past medical history of bronchiectasis COPD/emphysema MAC, hep C cirrhosis, HFrEF MR status post MitraClip, vocal cord paresis, dysphagia, GERD, presenting with multiple symptoms.  Patient states that he has having difficulty sleeping having intermittent nausea having dyspnea on exertion that is slightly worse than his normal baseline.  States that he is having difficulty coughing up his secretions.  He notes that he gets chest pain at times.  He also notes that he is having some abdominal pain.  He states that he is unable to make it to the bathroom in time secondary to his shortness of breath.    He notes that he has been following with hepatology, pulmonology for which he recently got a new provider, and is now presenting to the emergency department with frustration that all of his symptoms are persisting.  He feels that overall his health is getting worse and is frustrated that his symptoms or not improving.  He notes that he was using his albuterol inhalers at home prior to coming in.  He denies any wheezing.  Has been coughing up some thin secretions but no purulent sputum.  Has not had any fevers or chills.  No diarrhea.  No urinary symptoms.  Overall having normal bowel movements.    Per outpatient pulm note on 11/9/24    68 yo M w/ PMHx Bronchiectasis, COPD/emphysema, former smoker, Hep C cirrhosis (on 3rd tx), HFrEF (prev   25%, last 55%), MR s/p MV clip, vocal cord paresis, dysphagia, pulmonary MAC, GERD who presents to establish  care with a new pulmonary provider with "expertise in NTM/Bronchiectasis". Patient reports first being diagnosed   with MAC in 2012, more recently he has been followed for the last few years by a pulmonary provider at [facility] who  was following him clinically with annual CXR and spirometry. Early on in his diagnosis he was put on triple GBT for  his MAC but only took for 1 night d/t severe pain throughout his body, preventing him from getting out of bed so he  never attempted again. He is also followed from an ID standpoint by my colleague Dr. Josephine Steve. He reports   worsening shortness of breath over the last couple of years. Now he gets SOB just walking to the bathroom.   Sometimes his dyspnea becomes so severe that it has lead to urinary and even bowel incontinence. He is a   former smoker, quit 2.5yrs ago, smoked 1ppd for ~50yrs. He worked as an  for most of his life but   had to retire a few months ago because his dyspnea has become so limiting. He reports intermittent night sweats   and severe fatigue. Weight more stable recently but had dropped to 144lbs. He endorses a great deal of muscle   wasting over these last ocuple of years. He is currently on treatment for Hep C (this is his 3rd treatment). He was   recently put on a steroid taper over the last two weeks but noticed no benefit.   He attended Pulm Rehab earlier this year, only participated in 8-10 sessions but stopped d/t worsening   SOB/tachycardia?  He c/o dysphagia for pills/solids. Followed closely by GI, had EGD/C-scope ~1 month ago and was told everything  was "ok" per patient. Has not performed barium swallow or manometry testing.  He also underwent Bronchoscopy ~2yrs ago through [facility], had TBBx and BAL at that time, reports severe   hoarseness after bronchoscopy which never resolved - recalls being taken right over to ENT fo laryngoscopy and   told his "vocal cord wasn't moving".  On Pantoprazole for GERD            #Dysphagia  - at follow-up with GI should discuss dysphagia symptoms, at risk for aspiration  - may benefit from Barium swallow and/or manometry eval  - continue PPI daily  #Vocal cord paresis  - referral to ENT as may be contributing to aspiration and BARRIOS  - may benefit from repeat laryngoscopy to determine if problem persists and if intervention could be   warranted/attempted  #Hep C cirrhosis  - completing antiviral treatment within 2 weeks  - important part of risk assessment for abx under consideration for NTM

## 2024-11-11 NOTE — ED PROVIDER NOTE - CLINICAL SUMMARY MEDICAL DECISION MAKING FREE TEXT BOX
69-year-old male with history of COPD, emphysema, MAC, HFrEF, mitral valve regurgitation, presenting to the emergency department with multiple symptoms.  Per patient he is having the same symptoms that he discussed on previous outpatient pulmonary visit which is copied above, but notes that all of the symptoms have been worsening.  He is also tender on exam in the lower abdomen.  Vitals initially concerning for tachycardia to 130 but improved to 100s in the room.  Overall exam is reassuring as there is no wheeze, 1+ lower extremity pitting edema, and small amount of abdominal pain.  Given multitude of symptoms complex past medical history will scan abdomen to evaluate for appendicitis since patient has never had his appendix out.  Low suspicion for cholecystitis but will evaluate if labs returned normal.  Will screen for electrolyte abnormalities elevated liver function tests, elevated bilirubin, evidence of infection, and will get screening chest x-ray though he had a recent CT of the chest which was at baseline.  Patient is speaking in full sentences in the room without any hypoxia on room air making primary emergent respiratory etiology less likely.  Anticipate patient will discharge home with follow-up with his multiple providers.

## 2024-11-11 NOTE — ED PROVIDER NOTE - NSFOLLOWUPINSTRUCTIONS_ED_ALL_ED_FT
No signs of emergency medical condition on today's workup.  Presumptive diagnosis made, but further evaluation may be required by your primary care doctor or specialist for a definitive diagnosis.  Therefore, follow up as directed and if symptoms change/worsen or any emergency conditions, please return to the ER.    Please be sure to follow up with your pulmonologist to check in on your symptoms.

## 2024-11-11 NOTE — ED ADULT NURSE NOTE - NURSING NEURO ORIENTATION
"                                                    Physical Therapy Daily Note     Name: Alejandro Lopez  Clinic Number: [id number]  Diagnosis:   Encounter Diagnoses   Name Primary?    Muscle tightness     Decreased ROM of lumbar spine      Physician: Ivy Wise NP  Precautions: as listed in eval  Visit #: 5 of 90 SENA: 12/31/17  PTA Visit #: 1  Time In: 2:50 pm   Time Out: 3:50 pm  Total Treatment Time: 60 mins (1:1 with PTA for 50 mins)    Subjective     Patient reports: increased low back pain in the mornings, patient describes pain as "stiff and tight." Patient reports some Right calf pain today that he thinks is from increased activity the last few days.  Pain Scale: Alejandro rates pain on a scale of 0-10 to be 0 currently.    Objective     Alejandro received individual therapeutic exercises to develop strength, endurance, ROM, flexibility, posture and core stabilization for 60 minutes including:  Upright Bike: 8 minutes  Dynamic warm-up: knee to chest, ankle grab, monster kicks, lunges, drinking bird  Hamstring Stretch: 3 x 30" ea.  Hip Flexor Stretch with strap: 3 x 30" ea.  HL Piriformis Stretch: 3 x 30" hold ea.  SKTC: 5 x 10" hold ea.  HL Bridging: 2 x 10   TrA activation: 20 x 3" hold   Supine SLR: 2 x 10 ea.  SL Clams: 2 x 10 ea. (RLE sidelying hip abd)  Dead bugs: 2 minutes - UE flexion with opposite knee flexion    Shuttle 3 x 10 x 2 cords  SLS rebounder ball toss 2 x 10 ea. (Modified SLS RLE)    Consider adding next session: forward planks, quadriped alt UE/LE, SL hip abduction, TrA brace with march    Alejandro received the following manual therapy techniques: none    The patient received the following supervised modalities after being cleared for contradictions: none    Written Home Exercises Provided: reviewed from initial eval  Pt demo good understanding of the education provided. Alejandro demonstrated good return demonstration of activities.     Education provided re:   Alejandro verbalized good " understanding of education provided.   No spiritual or educational barriers to learning provided    Assessment     Alejandro tolerated treatment session well today. Good tolerance to therapeutic exercises with appropriate training effect achieved.  Patient challenged with dynamic warm-up likely due to decreased tissue extensibility in hamstrings and hip flexors as well as neuromuscular control of the RLE. Patient able to perform SLS on the RLE without modification this session. Plan to continue improving flexibility as well as core and hip strengthening as tolerated in the following sessions.   This is a 15 y.o. male referred to outpatient physical therapy and presents with a medical diagnosis of low back pain and bilateral LE radiculopathy and demonstrates limitations as described in the problem list. Pt prognosis is Fair. Pt will continue to benefit from skilled outpatient physical therapy to address the deficits listed in the problem list, provide pt/family education and to maximize pt's level of independence in the home and community environment.     Goals as follows:  Short Term Goals (4 Weeks):   1. Pt will report 20% reduced pain within the lumbar spine for ease with walking   2. Pt will demonstrate 1/3 improvement MMT in BLE for ease with climbing stairs to pt bedroom  4. Pt will demonstrate static standing balance on BLE for 30 seconds without obvious instability or use of UE assistance  5. Pt will demonstrate improved lumbar ROM by 25% in all directions for ease with picking an object up from the floor    Long Term Goals (8 Weeks):   1.Pt will report <2/10 pain within the lumbar spine for ease with ADL's  2. Pt will demonstrate 50% improvement of hamstring and hip flexor length in BLE for ease with ambulation  3. Pt will be independent with [facility] for maintenance of improvements gained in therapy sessions   4. Pt will demonstrate 4+/5 strength or greater in BLE for ease with running errands      Plan      Certification Period: 10/23/17 to 1/23/18 (PN due by 11/23/17)    Continue with established Plan of Care towards PT goals.    Therapist: Tereza Lundy, PTA  11/16/2017   oriented to person, place and time

## 2024-11-11 NOTE — ED PROVIDER NOTE - PROGRESS NOTE DETAILS
Reassessed. patient is feeling much better after oxycodone. States that he was able to sleep an hour. Feels the difficulty breathing and anxiety are reduced. Comfortable with d/c home. Indications for return discussed.

## 2024-11-11 NOTE — ED PROVIDER NOTE - PATIENT PORTAL LINK FT
You can access the FollowMyHealth Patient Portal offered by [facility] by registering at the following website: [email or web address]. By joining [facility]’s FollowMyHealth portal, you will also be able to view your health information using other applications (apps) compatible with our system.

## 2024-11-11 NOTE — ED ADULT NURSE NOTE - NSFALLHARMRISKINTERV_ED_ALL_ED

## 2024-11-11 NOTE — CONSULT NOTE ADULT - SUBJECTIVE AND OBJECTIVE BOX
HPI:  70 yo M w/ PMHx Bronchiectasis, COPD/emphysema, former smoker, Hep C cirrhosis (on 3rd tx), HFrEF (prev 25%, last 55%), MR s/p MV clip, vocal cord paresis, dysphagia, pulmonary MAC, GERD presenting with abdominal discomfort and SOB. Patient recently started care with Dr. Boss from Pulmonary group.     #Prior MAC - Now M abscessus   Patient was first diagnosed with MAC in 2012. Early on in his diagnosis he was put on triple GBT for his MAC but only took for 1 night d/t severe pain throughout his body, preventing him from getting out of bed so he never attempted again.    #Former Smoker/COPD  Quit 2.5yrs ago, smoked 1ppd for ~50yrs. He worked as an  for most of his life but had to retire a few months ago because his dyspnea has become so limiting. He reports intermittent night sweats and severe fatigue. He endorses a great deal of muscle wasting over these last ocuple of years. He attended Pulm Rehab earlier this year, only participated in 8-10 sessions but stopped d/t worsening SOB/tachycardia.     #Other active issues  - Had Bronchoscopy 2 years ago with secondary vocal chord limitation and hoarseness  - Dysphagia to solids/pills      Patient called after hours Pulmonary emergency line due to persistent dyspnea and inability to sleep. Reported that SOB has not changed much, however he says he has not slept in three days. Described he was using nebs every 12h but had to stop because of jitterness. Patient had been started on Levalbuterol nebulizer twice daily and 7% Hypersal BID.     PAST MEDICAL & SURGICAL HISTORY:  Hepatitis C      Hepatitis B infection with hepatitis C infection      Deviated septum      Arthritis      COPD, moderate      Other gram-negative sepsis      S/P hernia repair      H/O right knee surgery          FAMILY HISTORY:      SOCIAL HISTORY:  Smoking: [ ] Never Smoked [ ] Former Smoker (__ packs x ___ years) [ ] Current Smoker  (__ packs x ___ years)  Substance Use: [ ] Never Used [ ] Used ____  EtOH Use:  Marital Status: [ ] Single [ ]  [ ]  [ ]   Sexual History:   Occupation:  Recent Travel:  Country of Birth:  Advance Directives:    Allergies    No Known Allergies    Intolerances        HOME MEDICATIONS:  Home Medications:  Ambien 10 mg oral tablet: 1 tab(s) orally once a day (at bedtime) (28 Jul 2022 11:29)  gabapentin 300 mg oral capsule: 1 cap(s) orally 3 times a day (28 Jul 2022 11:29)  HYDROcodone 10 mg oral capsule, extended release: 1 cap(s) orally every 12 hours (28 Jul 2022 11:29)  omeprazole 40 mg oral delayed release capsule: 1 cap(s) orally once a day (28 Jul 2022 11:29)      REVIEW OF SYSTEMS:  All systems negative except as documented above.    OBJECTIVE:  ICU Vital Signs Last 24 Hrs  T(C): 36.6 (11 Nov 2024 05:05), Max: 36.6 (11 Nov 2024 01:30)  T(F): 97.9 (11 Nov 2024 05:05), Max: 97.9 (11 Nov 2024 05:05)  HR: 82 (11 Nov 2024 05:05) (82 - 123)  BP: 138/79 (11 Nov 2024 05:05) (138/79 - 149/94)  BP(mean): --  ABP: --  ABP(mean): --  RR: 18 (11 Nov 2024 05:05) (18 - 20)  SpO2: 99% (11 Nov 2024 05:05) (98% - 99%)    O2 Parameters below as of 11 Nov 2024 05:05  Patient On (Oxygen Delivery Method): room air              CAPILLARY BLOOD GLUCOSE          PHYSICAL EXAM:  General: NAD  HEENT: EOMI, sclera anicteric  Neck: supple  Cardiovascular: RR  Respiratory: CTAB, no wheezes, crackles, or rhonci  Abdomen: soft  Extremities: warm and well perfused, no edema, no clubbing  Skin: no rashes  Neurological: AOx3, no focal deficits    HOSPITAL MEDICATIONS:  Standing Meds:      PRN Meds:      LABS:                        8.1    10.63 )-----------( 285      ( 11 Nov 2024 02:05 )             28.8     Hgb Trend: 8.1<--  11-11    137  |  100  |  16  ----------------------------<  120[H]  3.8   |  21[L]  |  1.10    Ca    9.0      11 Nov 2024 02:05  Phos  3.2     11-11  Mg     1.6     11-11    TPro  7.2  /  Alb  3.4  /  TBili  0.7  /  DBili  x   /  AST  60[H]  /  ALT  19  /  AlkPhos  149[H]  11-11    Creatinine Trend: 1.10<--    Urinalysis Basic - ( 11 Nov 2024 03:29 )    Color: Yellow / Appearance: Clear / SG: >1.030 / pH: x  Gluc: x / Ketone: Negative mg/dL  / Bili: Negative / Urobili: 0.2 mg/dL   Blood: x / Protein: Negative mg/dL / Nitrite: Negative   Leuk Esterase: Negative / RBC: x / WBC x   Sq Epi: x / Non Sq Epi: x / Bacteria: x        Venous Blood Gas:  11-11 @ 01:57  7.45/37/68/26/95.0  VBG Lactate: 1.5      MICROBIOLOGY:       RADIOLOGY:  [x] Reviewed and interpreted by me

## 2024-11-11 NOTE — ED PROVIDER NOTE - ATTENDING CONTRIBUTION TO CARE
I was the supervising attending. I have independently seen face-to-face and examined the patient with the resident. I have reviewed the history and physical and discussed the MDM with the resident. I agree with the assessment and plan as presented unless otherwise documented as follows:    69M hx COPD/emphysema, MAC, HF, GERD, MR, HCV cirrhosis, prior hernia surgery, presenting w/ multiple medical complaints. Patient endorses poor sleep 2/2 multiple medical complaints that are chronic in nature but have been progressively worsening. He endorses exertional SOB with increased secretions and occasional cough w/ chest tightness. Has been using saline and albuterol nebulizer treatments as recommended by his pulmonologist. He also notes abdominal pain and urinary frequency with frequent bowel movements, often associated with episodes of SOB and finds himself running to the bathroom frequently. He otherwise denies fevers/chills, sick contacts/recent travel, recent medication changes. Appears well, AOx3, not in acute distress. Speaking in full sentences, no dyspnea/tachypnea/hypoxia. Lungs overall CTABL w/ bibasilar crackles, abdomen w/ suprapubic and RLQ tenderness, normal heart sounds. Patient overall well-appearing at this time and have higher suspicion for persistence/progression of known chronic symptoms given time duration, reassuring exam here, prior documentation in outpatient notes. Will evaluate for acute process, e.g. PNA vs. UTI vs. appendicitis vs. colitis/diverticulitis with labs, UA, XR, CT. Presentation not c/w acute COPD exacerbation given reassuring lung exam. Will reassess after pain medication. -Jesica Villalba MD (Attending)

## 2024-11-12 LAB
CULTURE RESULTS: SIGNIFICANT CHANGE UP
SPECIMEN SOURCE: SIGNIFICANT CHANGE UP

## 2024-11-13 ENCOUNTER — APPOINTMENT (OUTPATIENT)
Dept: PULMONOLOGY | Facility: CLINIC | Age: 69
End: 2024-11-13

## 2024-11-20 PROCEDURE — 84132 ASSAY OF SERUM POTASSIUM: CPT

## 2024-11-20 PROCEDURE — 82330 ASSAY OF CALCIUM: CPT

## 2024-11-20 PROCEDURE — 80053 COMPREHEN METABOLIC PANEL: CPT

## 2024-11-20 PROCEDURE — 84295 ASSAY OF SERUM SODIUM: CPT

## 2024-11-20 PROCEDURE — 84100 ASSAY OF PHOSPHORUS: CPT

## 2024-11-20 PROCEDURE — 74177 CT ABD & PELVIS W/CONTRAST: CPT | Mod: MC

## 2024-11-20 PROCEDURE — 99285 EMERGENCY DEPT VISIT HI MDM: CPT | Mod: 25

## 2024-11-20 PROCEDURE — 94640 AIRWAY INHALATION TREATMENT: CPT

## 2024-11-20 PROCEDURE — 83735 ASSAY OF MAGNESIUM: CPT

## 2024-11-20 PROCEDURE — 36000 PLACE NEEDLE IN VEIN: CPT | Mod: XU

## 2024-11-20 PROCEDURE — 85014 HEMATOCRIT: CPT

## 2024-11-20 PROCEDURE — 93005 ELECTROCARDIOGRAM TRACING: CPT

## 2024-11-20 PROCEDURE — 83880 ASSAY OF NATRIURETIC PEPTIDE: CPT

## 2024-11-20 PROCEDURE — 83605 ASSAY OF LACTIC ACID: CPT

## 2024-11-20 PROCEDURE — 82947 ASSAY GLUCOSE BLOOD QUANT: CPT

## 2024-11-20 PROCEDURE — 87086 URINE CULTURE/COLONY COUNT: CPT

## 2024-11-20 PROCEDURE — 85025 COMPLETE CBC W/AUTO DIFF WBC: CPT

## 2024-11-20 PROCEDURE — 82803 BLOOD GASES ANY COMBINATION: CPT

## 2024-11-20 PROCEDURE — 71045 X-RAY EXAM CHEST 1 VIEW: CPT

## 2024-11-20 PROCEDURE — 85018 HEMOGLOBIN: CPT

## 2024-11-20 PROCEDURE — 81003 URINALYSIS AUTO W/O SCOPE: CPT

## 2024-11-20 PROCEDURE — 82435 ASSAY OF BLOOD CHLORIDE: CPT

## 2024-11-21 ENCOUNTER — NON-APPOINTMENT (OUTPATIENT)
Age: 69
End: 2024-11-21

## 2024-11-21 ENCOUNTER — APPOINTMENT (OUTPATIENT)
Age: 69
End: 2024-11-21

## 2024-11-22 ENCOUNTER — APPOINTMENT (OUTPATIENT)
Age: 69
End: 2024-11-22

## 2024-11-27 ENCOUNTER — APPOINTMENT (OUTPATIENT)
Dept: PAIN MANAGEMENT | Facility: CLINIC | Age: 69
End: 2024-11-27
Payer: MEDICARE

## 2024-11-27 DIAGNOSIS — M96.1 POSTLAMINECTOMY SYNDROME, NOT ELSEWHERE CLASSIFIED: ICD-10-CM

## 2024-11-27 PROCEDURE — 99212 OFFICE O/P EST SF 10 MIN: CPT

## 2024-12-03 ENCOUNTER — APPOINTMENT (OUTPATIENT)
Dept: PULMONOLOGY | Facility: CLINIC | Age: 69
End: 2024-12-03
Payer: MEDICARE

## 2024-12-03 ENCOUNTER — APPOINTMENT (OUTPATIENT)
Dept: PULMONOLOGY | Facility: CLINIC | Age: 69
End: 2024-12-03

## 2024-12-03 VITALS
BODY MASS INDEX: 23.25 KG/M2 | DIASTOLIC BLOOD PRESSURE: 62 MMHG | RESPIRATION RATE: 17 BRPM | OXYGEN SATURATION: 94 % | HEART RATE: 75 BPM | HEIGHT: 69 IN | SYSTOLIC BLOOD PRESSURE: 98 MMHG | WEIGHT: 157 LBS

## 2024-12-03 DIAGNOSIS — I50.22 CHRONIC SYSTOLIC (CONGESTIVE) HEART FAILURE: ICD-10-CM

## 2024-12-03 PROCEDURE — 99215 OFFICE O/P EST HI 40 MIN: CPT | Mod: 25

## 2024-12-03 PROCEDURE — ZZZZZ: CPT

## 2024-12-03 PROCEDURE — 94726 PLETHYSMOGRAPHY LUNG VOLUMES: CPT

## 2024-12-03 PROCEDURE — 94060 EVALUATION OF WHEEZING: CPT

## 2024-12-03 PROCEDURE — 94729 DIFFUSING CAPACITY: CPT

## 2024-12-03 PROCEDURE — 90677 PCV20 VACCINE IM: CPT

## 2024-12-03 PROCEDURE — G0009: CPT

## 2024-12-03 PROCEDURE — 94618 PULMONARY STRESS TESTING: CPT

## 2024-12-11 ENCOUNTER — APPOINTMENT (OUTPATIENT)
Dept: PAIN MANAGEMENT | Facility: CLINIC | Age: 69
End: 2024-12-11
Payer: MEDICARE

## 2024-12-11 DIAGNOSIS — M54.16 RADICULOPATHY, LUMBAR REGION: ICD-10-CM

## 2024-12-11 DIAGNOSIS — M96.1 POSTLAMINECTOMY SYNDROME, NOT ELSEWHERE CLASSIFIED: ICD-10-CM

## 2024-12-11 PROCEDURE — 99212 OFFICE O/P EST SF 10 MIN: CPT

## 2024-12-16 PROBLEM — Z23 ENCOUNTER FOR IMMUNIZATION: Status: ACTIVE | Noted: 2024-12-16 | Resolved: 2024-12-30

## 2025-01-10 ENCOUNTER — APPOINTMENT (OUTPATIENT)
Dept: PAIN MANAGEMENT | Facility: CLINIC | Age: 70
End: 2025-01-10
Payer: MEDICARE

## 2025-01-10 DIAGNOSIS — G89.4 CHRONIC PAIN SYNDROME: ICD-10-CM

## 2025-01-10 DIAGNOSIS — M54.16 RADICULOPATHY, LUMBAR REGION: ICD-10-CM

## 2025-01-10 DIAGNOSIS — M96.1 POSTLAMINECTOMY SYNDROME, NOT ELSEWHERE CLASSIFIED: ICD-10-CM

## 2025-01-10 PROCEDURE — 99213 OFFICE O/P EST LOW 20 MIN: CPT

## 2025-01-14 ENCOUNTER — APPOINTMENT (OUTPATIENT)
Age: 70
End: 2025-01-14
Payer: MEDICARE

## 2025-01-14 VITALS
HEIGHT: 69 IN | OXYGEN SATURATION: 93 % | HEART RATE: 94 BPM | DIASTOLIC BLOOD PRESSURE: 82 MMHG | RESPIRATION RATE: 18 BRPM | WEIGHT: 156 LBS | SYSTOLIC BLOOD PRESSURE: 143 MMHG | BODY MASS INDEX: 23.11 KG/M2

## 2025-01-14 DIAGNOSIS — A31.0 PULMONARY MYCOBACTERIAL INFECTION: ICD-10-CM

## 2025-01-14 DIAGNOSIS — J47.9 BRONCHIECTASIS, UNCOMPLICATED: ICD-10-CM

## 2025-01-14 DIAGNOSIS — J44.9 CHRONIC OBSTRUCTIVE PULMONARY DISEASE, UNSPECIFIED: ICD-10-CM

## 2025-01-14 DIAGNOSIS — I50.22 CHRONIC SYSTOLIC (CONGESTIVE) HEART FAILURE: ICD-10-CM

## 2025-01-14 DIAGNOSIS — L28.1 PRURIGO NODULARIS: ICD-10-CM

## 2025-01-14 DIAGNOSIS — F41.9 ANXIETY DISORDER, UNSPECIFIED: ICD-10-CM

## 2025-01-14 DIAGNOSIS — K74.02 HEPATIC FIBROSIS, ADVANCED FIBROSIS: ICD-10-CM

## 2025-01-14 PROCEDURE — 99215 OFFICE O/P EST HI 40 MIN: CPT

## 2025-02-10 ENCOUNTER — APPOINTMENT (OUTPATIENT)
Dept: PAIN MANAGEMENT | Facility: CLINIC | Age: 70
End: 2025-02-10

## 2025-02-11 ENCOUNTER — APPOINTMENT (OUTPATIENT)
Dept: INTERNAL MEDICINE | Facility: CLINIC | Age: 70
End: 2025-02-11
Payer: MEDICARE

## 2025-02-11 DIAGNOSIS — F11.93 OPIOID USE, UNSPECIFIED WITH WITHDRAWAL: ICD-10-CM

## 2025-02-11 PROCEDURE — 99203 OFFICE O/P NEW LOW 30 MIN: CPT | Mod: 95

## 2025-02-11 PROCEDURE — 99204 OFFICE O/P NEW MOD 45 MIN: CPT | Mod: 95

## 2025-02-11 RX ORDER — BUPRENORPHINE AND NALOXONE 2; .5 MG/1; MG/1
2-0.5 TABLET SUBLINGUAL TWICE DAILY
Qty: 14 | Refills: 0 | Status: ACTIVE | COMMUNITY
Start: 2025-02-11 | End: 1900-01-01

## 2025-02-11 RX ORDER — NALOXONE HYDROCHLORIDE NASAL 4 MG/.1ML
4 SPRAY NASAL
Qty: 1 | Refills: 5 | Status: ACTIVE | COMMUNITY
Start: 2025-02-11 | End: 1900-01-01

## 2025-02-12 ENCOUNTER — APPOINTMENT (OUTPATIENT)
Dept: CARDIOTHORACIC SURGERY | Facility: CLINIC | Age: 70
End: 2025-02-12
Payer: MEDICARE

## 2025-02-12 VITALS
OXYGEN SATURATION: 92 % | WEIGHT: 156 LBS | HEART RATE: 92 BPM | SYSTOLIC BLOOD PRESSURE: 125 MMHG | HEIGHT: 69 IN | DIASTOLIC BLOOD PRESSURE: 76 MMHG | BODY MASS INDEX: 23.11 KG/M2 | TEMPERATURE: 98 F | RESPIRATION RATE: 16 BRPM

## 2025-02-12 DIAGNOSIS — J44.9 CHRONIC OBSTRUCTIVE PULMONARY DISEASE, UNSPECIFIED: ICD-10-CM

## 2025-02-12 DIAGNOSIS — K74.02 HEPATIC FIBROSIS, ADVANCED FIBROSIS: ICD-10-CM

## 2025-02-12 DIAGNOSIS — I50.22 CHRONIC SYSTOLIC (CONGESTIVE) HEART FAILURE: ICD-10-CM

## 2025-02-12 DIAGNOSIS — J47.9 BRONCHIECTASIS, UNCOMPLICATED: ICD-10-CM

## 2025-02-12 DIAGNOSIS — R13.10 DYSPHAGIA, UNSPECIFIED: ICD-10-CM

## 2025-02-12 DIAGNOSIS — B19.20 UNSPECIFIED VIRAL HEPATITIS C W/OUT HEPATIC COMA: ICD-10-CM

## 2025-02-12 DIAGNOSIS — I34.0 NONRHEUMATIC MITRAL (VALVE) INSUFFICIENCY: ICD-10-CM

## 2025-02-12 PROCEDURE — 99205 OFFICE O/P NEW HI 60 MIN: CPT

## 2025-02-18 ENCOUNTER — OUTPATIENT (OUTPATIENT)
Dept: OUTPATIENT SERVICES | Facility: HOSPITAL | Age: 70
LOS: 1 days | End: 2025-02-18

## 2025-02-18 ENCOUNTER — APPOINTMENT (OUTPATIENT)
Dept: INTERNAL MEDICINE | Facility: CLINIC | Age: 70
End: 2025-02-18
Payer: MEDICARE

## 2025-02-18 DIAGNOSIS — A41.59 OTHER GRAM-NEGATIVE SEPSIS: Chronic | ICD-10-CM

## 2025-02-18 DIAGNOSIS — Z98.890 OTHER SPECIFIED POSTPROCEDURAL STATES: Chronic | ICD-10-CM

## 2025-02-18 PROCEDURE — 99213 OFFICE O/P EST LOW 20 MIN: CPT | Mod: 95

## 2025-02-19 ENCOUNTER — NON-APPOINTMENT (OUTPATIENT)
Age: 70
End: 2025-02-19

## 2025-02-20 DIAGNOSIS — I10 ESSENTIAL (PRIMARY) HYPERTENSION: ICD-10-CM

## 2025-02-24 DIAGNOSIS — Z79.891 LONG TERM (CURRENT) USE OF OPIATE ANALGESIC: ICD-10-CM

## 2025-02-25 ENCOUNTER — APPOINTMENT (OUTPATIENT)
Dept: INTERNAL MEDICINE | Facility: CLINIC | Age: 70
End: 2025-02-25
Payer: MEDICARE

## 2025-02-25 ENCOUNTER — OUTPATIENT (OUTPATIENT)
Dept: OUTPATIENT SERVICES | Facility: HOSPITAL | Age: 70
LOS: 1 days | End: 2025-02-25

## 2025-02-25 DIAGNOSIS — F11.93 OPIOID USE, UNSPECIFIED WITH WITHDRAWAL: ICD-10-CM

## 2025-02-25 DIAGNOSIS — A41.59 OTHER GRAM-NEGATIVE SEPSIS: Chronic | ICD-10-CM

## 2025-02-25 PROCEDURE — 99213 OFFICE O/P EST LOW 20 MIN: CPT | Mod: 95

## 2025-02-27 RX ORDER — BUPRENORPHINE HYDROCHLORIDE 2 MG/1
2 TABLET SUBLINGUAL TWICE DAILY
Qty: 20 | Refills: 0 | Status: DISCONTINUED | COMMUNITY
Start: 2025-02-25 | End: 2025-02-27

## 2025-02-27 RX ORDER — BUPRENORPHINE AND NALOXONE 2; .5 MG/1; MG/1
2-0.5 FILM BUCCAL; SUBLINGUAL
Qty: 20 | Refills: 0 | Status: ACTIVE | COMMUNITY
Start: 2025-02-27 | End: 1900-01-01

## 2025-03-03 ENCOUNTER — APPOINTMENT (OUTPATIENT)
Dept: INTERNAL MEDICINE | Facility: CLINIC | Age: 70
End: 2025-03-03
Payer: MEDICARE

## 2025-03-03 ENCOUNTER — OUTPATIENT (OUTPATIENT)
Dept: OUTPATIENT SERVICES | Facility: HOSPITAL | Age: 70
LOS: 1 days | End: 2025-03-03

## 2025-03-03 DIAGNOSIS — Z98.890 OTHER SPECIFIED POSTPROCEDURAL STATES: Chronic | ICD-10-CM

## 2025-03-03 PROCEDURE — 99213 OFFICE O/P EST LOW 20 MIN: CPT | Mod: 95

## 2025-03-04 ENCOUNTER — APPOINTMENT (OUTPATIENT)
Dept: PULMONOLOGY | Facility: CLINIC | Age: 70
End: 2025-03-04
Payer: MEDICARE

## 2025-03-04 VITALS
RESPIRATION RATE: 17 BRPM | OXYGEN SATURATION: 95 % | BODY MASS INDEX: 22.51 KG/M2 | HEIGHT: 69 IN | DIASTOLIC BLOOD PRESSURE: 69 MMHG | WEIGHT: 152 LBS | SYSTOLIC BLOOD PRESSURE: 105 MMHG | HEART RATE: 83 BPM

## 2025-03-04 DIAGNOSIS — F41.9 ANXIETY DISORDER, UNSPECIFIED: ICD-10-CM

## 2025-03-04 DIAGNOSIS — J47.9 BRONCHIECTASIS, UNCOMPLICATED: ICD-10-CM

## 2025-03-04 DIAGNOSIS — J44.9 CHRONIC OBSTRUCTIVE PULMONARY DISEASE, UNSPECIFIED: ICD-10-CM

## 2025-03-04 PROCEDURE — 99214 OFFICE O/P EST MOD 30 MIN: CPT

## 2025-03-04 RX ORDER — ALBUTEROL SULFATE AND BUDESONIDE 90; 80 UG/1; UG/1
90-80 AEROSOL, METERED RESPIRATORY (INHALATION) EVERY 4 HOURS
Qty: 1 | Refills: 3 | Status: ACTIVE | COMMUNITY
Start: 2025-03-04 | End: 1900-01-01

## 2025-03-11 ENCOUNTER — APPOINTMENT (OUTPATIENT)
Dept: INTERNAL MEDICINE | Facility: CLINIC | Age: 70
End: 2025-03-11

## 2025-03-11 ENCOUNTER — APPOINTMENT (OUTPATIENT)
Dept: PAIN MANAGEMENT | Facility: CLINIC | Age: 70
End: 2025-03-11

## 2025-03-11 ENCOUNTER — NON-APPOINTMENT (OUTPATIENT)
Age: 70
End: 2025-03-11

## 2025-03-11 VITALS — WEIGHT: 154 LBS | BODY MASS INDEX: 22.81 KG/M2 | HEIGHT: 69 IN

## 2025-03-13 ENCOUNTER — OUTPATIENT (OUTPATIENT)
Dept: OUTPATIENT SERVICES | Facility: HOSPITAL | Age: 70
LOS: 1 days | End: 2025-03-13

## 2025-03-13 ENCOUNTER — APPOINTMENT (OUTPATIENT)
Dept: INTERNAL MEDICINE | Facility: CLINIC | Age: 70
End: 2025-03-13
Payer: MEDICARE

## 2025-03-13 DIAGNOSIS — Z79.891 LONG TERM (CURRENT) USE OF OPIATE ANALGESIC: ICD-10-CM

## 2025-03-13 DIAGNOSIS — Z98.890 OTHER SPECIFIED POSTPROCEDURAL STATES: Chronic | ICD-10-CM

## 2025-03-13 DIAGNOSIS — A41.59 OTHER GRAM-NEGATIVE SEPSIS: Chronic | ICD-10-CM

## 2025-03-13 PROCEDURE — 99213 OFFICE O/P EST LOW 20 MIN: CPT | Mod: 95

## 2025-03-14 ENCOUNTER — APPOINTMENT (OUTPATIENT)
Dept: PAIN MANAGEMENT | Facility: CLINIC | Age: 70
End: 2025-03-14
Payer: MEDICARE

## 2025-03-14 DIAGNOSIS — M54.16 RADICULOPATHY, LUMBAR REGION: ICD-10-CM

## 2025-03-14 DIAGNOSIS — M96.1 POSTLAMINECTOMY SYNDROME, NOT ELSEWHERE CLASSIFIED: ICD-10-CM

## 2025-03-14 PROCEDURE — 99213 OFFICE O/P EST LOW 20 MIN: CPT | Mod: 2W

## 2025-03-14 RX ORDER — HYDROCODONE BITARTRATE AND ACETAMINOPHEN 5; 325 MG/1; MG/1
5-325 TABLET ORAL
Qty: 60 | Refills: 0 | Status: ACTIVE | COMMUNITY
Start: 2025-03-14 | End: 1900-01-01

## 2025-03-15 LAB
ACID FAST STN SPT: NORMAL
BACTERIA SPT CULT: NORMAL

## 2025-03-18 ENCOUNTER — APPOINTMENT (OUTPATIENT)
Dept: INTERNAL MEDICINE | Facility: CLINIC | Age: 70
End: 2025-03-18
Payer: MEDICARE

## 2025-03-18 ENCOUNTER — OUTPATIENT (OUTPATIENT)
Dept: OUTPATIENT SERVICES | Facility: HOSPITAL | Age: 70
LOS: 1 days | End: 2025-03-18

## 2025-03-18 DIAGNOSIS — Z79.891 LONG TERM (CURRENT) USE OF OPIATE ANALGESIC: ICD-10-CM

## 2025-03-18 DIAGNOSIS — A41.59 OTHER GRAM-NEGATIVE SEPSIS: Chronic | ICD-10-CM

## 2025-03-18 DIAGNOSIS — Z98.890 OTHER SPECIFIED POSTPROCEDURAL STATES: Chronic | ICD-10-CM

## 2025-03-18 PROCEDURE — 99214 OFFICE O/P EST MOD 30 MIN: CPT | Mod: 95

## 2025-03-26 DIAGNOSIS — I10 ESSENTIAL (PRIMARY) HYPERTENSION: ICD-10-CM

## 2025-03-27 DIAGNOSIS — I10 ESSENTIAL (PRIMARY) HYPERTENSION: ICD-10-CM

## 2025-04-08 ENCOUNTER — NON-APPOINTMENT (OUTPATIENT)
Age: 70
End: 2025-04-08

## 2025-04-08 ENCOUNTER — APPOINTMENT (OUTPATIENT)
Dept: INTERNAL MEDICINE | Facility: CLINIC | Age: 70
End: 2025-04-08
Payer: MEDICARE

## 2025-04-08 DIAGNOSIS — Z79.891 LONG TERM (CURRENT) USE OF OPIATE ANALGESIC: ICD-10-CM

## 2025-04-08 PROCEDURE — 99213 OFFICE O/P EST LOW 20 MIN: CPT | Mod: 95

## 2025-04-09 ENCOUNTER — APPOINTMENT (OUTPATIENT)
Dept: PAIN MANAGEMENT | Facility: CLINIC | Age: 70
End: 2025-04-09

## 2025-05-02 ENCOUNTER — OUTPATIENT (OUTPATIENT)
Dept: OUTPATIENT SERVICES | Facility: HOSPITAL | Age: 70
LOS: 1 days | End: 2025-05-02
Payer: MEDICARE

## 2025-05-02 ENCOUNTER — APPOINTMENT (OUTPATIENT)
Dept: CT IMAGING | Facility: CLINIC | Age: 70
End: 2025-05-02

## 2025-05-02 DIAGNOSIS — J47.1 BRONCHIECTASIS WITH (ACUTE) EXACERBATION: ICD-10-CM

## 2025-05-02 DIAGNOSIS — Z98.890 OTHER SPECIFIED POSTPROCEDURAL STATES: Chronic | ICD-10-CM

## 2025-05-02 DIAGNOSIS — A41.59 OTHER GRAM-NEGATIVE SEPSIS: Chronic | ICD-10-CM

## 2025-05-02 PROCEDURE — 71250 CT THORAX DX C-: CPT | Mod: 26

## 2025-05-02 PROCEDURE — 71250 CT THORAX DX C-: CPT

## 2025-05-06 ENCOUNTER — NON-APPOINTMENT (OUTPATIENT)
Age: 70
End: 2025-05-06

## 2025-05-06 ENCOUNTER — APPOINTMENT (OUTPATIENT)
Age: 70
End: 2025-05-06
Payer: MEDICARE

## 2025-05-06 VITALS
OXYGEN SATURATION: 94 % | HEART RATE: 66 BPM | WEIGHT: 149 LBS | TEMPERATURE: 97 F | DIASTOLIC BLOOD PRESSURE: 72 MMHG | SYSTOLIC BLOOD PRESSURE: 123 MMHG | RESPIRATION RATE: 15 BRPM | HEIGHT: 69 IN | BODY MASS INDEX: 22.07 KG/M2

## 2025-05-06 DIAGNOSIS — A31.0 PULMONARY MYCOBACTERIAL INFECTION: ICD-10-CM

## 2025-05-06 DIAGNOSIS — J44.9 CHRONIC OBSTRUCTIVE PULMONARY DISEASE, UNSPECIFIED: ICD-10-CM

## 2025-05-06 DIAGNOSIS — K74.02 HEPATIC FIBROSIS, ADVANCED FIBROSIS: ICD-10-CM

## 2025-05-06 DIAGNOSIS — J47.9 BRONCHIECTASIS, UNCOMPLICATED: ICD-10-CM

## 2025-05-06 DIAGNOSIS — I34.0 NONRHEUMATIC MITRAL (VALVE) INSUFFICIENCY: ICD-10-CM

## 2025-05-06 PROCEDURE — 99214 OFFICE O/P EST MOD 30 MIN: CPT

## 2025-05-07 ENCOUNTER — APPOINTMENT (OUTPATIENT)
Dept: INTERNAL MEDICINE | Facility: CLINIC | Age: 70
End: 2025-05-07
Payer: MEDICARE

## 2025-05-07 ENCOUNTER — OUTPATIENT (OUTPATIENT)
Dept: OUTPATIENT SERVICES | Facility: HOSPITAL | Age: 70
LOS: 1 days | End: 2025-05-07

## 2025-05-07 DIAGNOSIS — Z98.890 OTHER SPECIFIED POSTPROCEDURAL STATES: Chronic | ICD-10-CM

## 2025-05-07 DIAGNOSIS — A41.59 OTHER GRAM-NEGATIVE SEPSIS: Chronic | ICD-10-CM

## 2025-05-07 DIAGNOSIS — Z79.891 LONG TERM (CURRENT) USE OF OPIATE ANALGESIC: ICD-10-CM

## 2025-05-07 LAB
ALBUMIN SERPL ELPH-MCNC: 4.2 G/DL
ALP BLD-CCNC: 224 U/L
ALT SERPL-CCNC: 39 U/L
ANION GAP SERPL CALC-SCNC: 15 MMOL/L
AST SERPL-CCNC: 45 U/L
BASOPHILS # BLD AUTO: 0.05 K/UL
BASOPHILS NFR BLD AUTO: 0.4 %
BILIRUB SERPL-MCNC: 0.9 MG/DL
BUN SERPL-MCNC: 27 MG/DL
CALCIUM SERPL-MCNC: 9.8 MG/DL
CHLORIDE SERPL-SCNC: 99 MMOL/L
CO2 SERPL-SCNC: 25 MMOL/L
CREAT SERPL-MCNC: 1.62 MG/DL
CRP SERPL-MCNC: 15 MG/L
EGFRCR SERPLBLD CKD-EPI 2021: 45 ML/MIN/1.73M2
EOSINOPHIL # BLD AUTO: 0.68 K/UL
EOSINOPHIL NFR BLD AUTO: 5.7 %
ERYTHROCYTE [SEDIMENTATION RATE] IN BLOOD BY WESTERGREN METHOD: 69 MM/HR
GLUCOSE SERPL-MCNC: 102 MG/DL
HCT VFR BLD CALC: 50.1 %
HGB BLD-MCNC: 15.4 G/DL
IMM GRANULOCYTES NFR BLD AUTO: 0.3 %
LYMPHOCYTES # BLD AUTO: 1.36 K/UL
LYMPHOCYTES NFR BLD AUTO: 11.5 %
MAN DIFF?: NORMAL
MCHC RBC-ENTMCNC: 28.7 PG
MCHC RBC-ENTMCNC: 30.7 G/DL
MCV RBC AUTO: 93.3 FL
MONOCYTES # BLD AUTO: 1.14 K/UL
MONOCYTES NFR BLD AUTO: 9.6 %
NEUTROPHILS # BLD AUTO: 8.58 K/UL
NEUTROPHILS NFR BLD AUTO: 72.5 %
PLATELET # BLD AUTO: 154 K/UL
POTASSIUM SERPL-SCNC: 4.9 MMOL/L
PROT SERPL-MCNC: 8.1 G/DL
RBC # BLD: 5.37 M/UL
RBC # FLD: 19.5 %
SODIUM SERPL-SCNC: 139 MMOL/L
WBC # FLD AUTO: 11.85 K/UL

## 2025-05-07 PROCEDURE — 99213 OFFICE O/P EST LOW 20 MIN: CPT | Mod: 95

## 2025-05-14 ENCOUNTER — APPOINTMENT (OUTPATIENT)
Dept: PULMONOLOGY | Facility: CLINIC | Age: 70
End: 2025-05-14

## 2025-05-14 ENCOUNTER — APPOINTMENT (OUTPATIENT)
Dept: ORTHOPEDIC SURGERY | Facility: CLINIC | Age: 70
End: 2025-05-14
Payer: MEDICARE

## 2025-05-14 VITALS — WEIGHT: 149 LBS | HEIGHT: 69 IN | BODY MASS INDEX: 22.07 KG/M2

## 2025-05-14 PROBLEM — M17.11 PRIMARY OSTEOARTHRITIS OF RIGHT KNEE: Status: ACTIVE | Noted: 2025-05-14

## 2025-05-14 PROCEDURE — 99204 OFFICE O/P NEW MOD 45 MIN: CPT | Mod: 25

## 2025-05-14 PROCEDURE — 73564 X-RAY EXAM KNEE 4 OR MORE: CPT | Mod: RT

## 2025-05-14 PROCEDURE — 20610 DRAIN/INJ JOINT/BURSA W/O US: CPT | Mod: RT

## 2025-05-20 ENCOUNTER — APPOINTMENT (OUTPATIENT)
Age: 70
End: 2025-05-20
Payer: MEDICARE

## 2025-05-20 PROCEDURE — 99214 OFFICE O/P EST MOD 30 MIN: CPT | Mod: 2W

## 2025-05-21 ENCOUNTER — APPOINTMENT (OUTPATIENT)
Dept: ORTHOPEDIC SURGERY | Facility: CLINIC | Age: 70
End: 2025-05-21
Payer: MEDICARE

## 2025-05-21 VITALS — BODY MASS INDEX: 22.07 KG/M2 | HEIGHT: 69 IN | WEIGHT: 149 LBS

## 2025-05-21 PROCEDURE — 20610 DRAIN/INJ JOINT/BURSA W/O US: CPT | Mod: RT

## 2025-05-28 ENCOUNTER — APPOINTMENT (OUTPATIENT)
Dept: ORTHOPEDIC SURGERY | Facility: CLINIC | Age: 70
End: 2025-05-28
Payer: MEDICARE

## 2025-05-28 VITALS — WEIGHT: 149 LBS | HEIGHT: 69 IN | BODY MASS INDEX: 22.07 KG/M2

## 2025-05-28 DIAGNOSIS — M17.11 UNILATERAL PRIMARY OSTEOARTHRITIS, RIGHT KNEE: ICD-10-CM

## 2025-05-28 PROCEDURE — 20610 DRAIN/INJ JOINT/BURSA W/O US: CPT | Mod: RT

## 2025-05-29 DIAGNOSIS — I10 ESSENTIAL (PRIMARY) HYPERTENSION: ICD-10-CM

## 2025-06-03 ENCOUNTER — APPOINTMENT (OUTPATIENT)
Dept: INTERNAL MEDICINE | Facility: CLINIC | Age: 70
End: 2025-06-03

## 2025-06-04 ENCOUNTER — APPOINTMENT (OUTPATIENT)
Dept: ORTHOPEDIC SURGERY | Facility: CLINIC | Age: 70
End: 2025-06-04
Payer: MEDICARE

## 2025-06-04 VITALS — WEIGHT: 149 LBS | HEIGHT: 69 IN | BODY MASS INDEX: 22.07 KG/M2

## 2025-06-04 PROCEDURE — 73564 X-RAY EXAM KNEE 4 OR MORE: CPT | Mod: LT

## 2025-06-04 PROCEDURE — 20610 DRAIN/INJ JOINT/BURSA W/O US: CPT | Mod: LT

## 2025-06-04 PROCEDURE — 99214 OFFICE O/P EST MOD 30 MIN: CPT | Mod: 25

## 2025-06-05 DIAGNOSIS — I10 ESSENTIAL (PRIMARY) HYPERTENSION: ICD-10-CM

## 2025-06-10 ENCOUNTER — APPOINTMENT (OUTPATIENT)
Dept: INTERNAL MEDICINE | Facility: CLINIC | Age: 70
End: 2025-06-10
Payer: MEDICARE

## 2025-06-10 ENCOUNTER — OUTPATIENT (OUTPATIENT)
Dept: OUTPATIENT SERVICES | Facility: HOSPITAL | Age: 70
LOS: 1 days | End: 2025-06-10

## 2025-06-10 ENCOUNTER — APPOINTMENT (OUTPATIENT)
Dept: ORTHOPEDIC SURGERY | Facility: CLINIC | Age: 70
End: 2025-06-10
Payer: MEDICARE

## 2025-06-10 DIAGNOSIS — I10 ESSENTIAL (PRIMARY) HYPERTENSION: ICD-10-CM

## 2025-06-10 DIAGNOSIS — A41.59 OTHER GRAM-NEGATIVE SEPSIS: Chronic | ICD-10-CM

## 2025-06-10 DIAGNOSIS — Z98.890 OTHER SPECIFIED POSTPROCEDURAL STATES: Chronic | ICD-10-CM

## 2025-06-10 PROCEDURE — 99214 OFFICE O/P EST MOD 30 MIN: CPT | Mod: 25

## 2025-06-10 PROCEDURE — 20610 DRAIN/INJ JOINT/BURSA W/O US: CPT | Mod: LT

## 2025-06-10 PROCEDURE — 99213 OFFICE O/P EST LOW 20 MIN: CPT | Mod: 95

## 2025-06-18 ENCOUNTER — APPOINTMENT (OUTPATIENT)
Dept: ORTHOPEDIC SURGERY | Facility: CLINIC | Age: 70
End: 2025-06-18
Payer: MEDICARE

## 2025-06-18 VITALS — WEIGHT: 149 LBS | BODY MASS INDEX: 22.07 KG/M2 | HEIGHT: 69 IN

## 2025-06-18 PROBLEM — M17.12 ARTHRITIS OF KNEE, LEFT: Status: ACTIVE | Noted: 2025-06-10

## 2025-06-18 PROCEDURE — 20611 DRAIN/INJ JOINT/BURSA W/US: CPT | Mod: LT

## 2025-06-19 DIAGNOSIS — Z79.891 LONG TERM (CURRENT) USE OF OPIATE ANALGESIC: ICD-10-CM

## 2025-06-25 ENCOUNTER — APPOINTMENT (OUTPATIENT)
Dept: ORTHOPEDIC SURGERY | Facility: CLINIC | Age: 70
End: 2025-06-25

## 2025-07-07 ENCOUNTER — APPOINTMENT (OUTPATIENT)
Dept: PAIN MANAGEMENT | Facility: CLINIC | Age: 70
End: 2025-07-07

## 2025-07-08 ENCOUNTER — APPOINTMENT (OUTPATIENT)
Dept: INTERNAL MEDICINE | Facility: CLINIC | Age: 70
End: 2025-07-08
Payer: MEDICARE

## 2025-07-08 PROCEDURE — 99213 OFFICE O/P EST LOW 20 MIN: CPT | Mod: 95

## 2025-07-10 ENCOUNTER — APPOINTMENT (OUTPATIENT)
Dept: ULTRASOUND IMAGING | Facility: CLINIC | Age: 70
End: 2025-07-10
Payer: MEDICARE

## 2025-07-10 ENCOUNTER — OUTPATIENT (OUTPATIENT)
Dept: OUTPATIENT SERVICES | Facility: HOSPITAL | Age: 70
LOS: 1 days | End: 2025-07-10
Payer: MEDICARE

## 2025-07-10 DIAGNOSIS — Z98.890 OTHER SPECIFIED POSTPROCEDURAL STATES: Chronic | ICD-10-CM

## 2025-07-10 DIAGNOSIS — I10 ESSENTIAL (PRIMARY) HYPERTENSION: ICD-10-CM

## 2025-07-10 DIAGNOSIS — A41.59 OTHER GRAM-NEGATIVE SEPSIS: Chronic | ICD-10-CM

## 2025-07-10 DIAGNOSIS — K74.60 UNSPECIFIED CIRRHOSIS OF LIVER: ICD-10-CM

## 2025-07-10 DIAGNOSIS — Z00.8 ENCOUNTER FOR OTHER GENERAL EXAMINATION: ICD-10-CM

## 2025-07-10 PROCEDURE — 76700 US EXAM ABDOM COMPLETE: CPT

## 2025-07-10 PROCEDURE — 76700 US EXAM ABDOM COMPLETE: CPT | Mod: 26

## 2025-07-22 ENCOUNTER — APPOINTMENT (OUTPATIENT)
Age: 70
End: 2025-07-22
Payer: MEDICARE

## 2025-07-22 VITALS
DIASTOLIC BLOOD PRESSURE: 58 MMHG | OXYGEN SATURATION: 95 % | RESPIRATION RATE: 15 BRPM | WEIGHT: 150 LBS | HEART RATE: 70 BPM | BODY MASS INDEX: 22.22 KG/M2 | SYSTOLIC BLOOD PRESSURE: 100 MMHG | HEIGHT: 69 IN

## 2025-07-22 DIAGNOSIS — A31.8 OTHER MYCOBACTERIAL INFECTIONS: ICD-10-CM

## 2025-07-22 DIAGNOSIS — K74.02 HEPATIC FIBROSIS, ADVANCED FIBROSIS: ICD-10-CM

## 2025-07-22 DIAGNOSIS — J47.9 BRONCHIECTASIS, UNCOMPLICATED: ICD-10-CM

## 2025-07-22 PROCEDURE — 36415 COLL VENOUS BLD VENIPUNCTURE: CPT

## 2025-07-22 PROCEDURE — 99215 OFFICE O/P EST HI 40 MIN: CPT

## 2025-07-24 LAB
ALBUMIN SERPL ELPH-MCNC: 4.1 G/DL
ALP BLD-CCNC: 188 U/L
ALT SERPL-CCNC: 32 U/L
ANION GAP SERPL CALC-SCNC: 14 MMOL/L
AST SERPL-CCNC: 32 U/L
BASOPHILS # BLD AUTO: 0.07 K/UL
BASOPHILS NFR BLD AUTO: 0.6 %
BILIRUB SERPL-MCNC: 0.4 MG/DL
BUN SERPL-MCNC: 21 MG/DL
CALCIUM SERPL-MCNC: 9.6 MG/DL
CHLORIDE SERPL-SCNC: 102 MMOL/L
CO2 SERPL-SCNC: 24 MMOL/L
CREAT SERPL-MCNC: 1.68 MG/DL
CRP SERPL-MCNC: 9 MG/L
EGFRCR SERPLBLD CKD-EPI 2021: 43 ML/MIN/1.73M2
EOSINOPHIL # BLD AUTO: 1.02 K/UL
EOSINOPHIL NFR BLD AUTO: 8.8 %
ERYTHROCYTE [SEDIMENTATION RATE] IN BLOOD BY WESTERGREN METHOD: 42 MM/HR
GLUCOSE SERPL-MCNC: 87 MG/DL
HCT VFR BLD CALC: 44.9 %
HGB BLD-MCNC: 14.4 G/DL
IMM GRANULOCYTES NFR BLD AUTO: 0.3 %
LYMPHOCYTES # BLD AUTO: 1.63 K/UL
LYMPHOCYTES NFR BLD AUTO: 14 %
MAN DIFF?: NORMAL
MCHC RBC-ENTMCNC: 30.3 PG
MCHC RBC-ENTMCNC: 32.1 G/DL
MCV RBC AUTO: 94.5 FL
MONOCYTES # BLD AUTO: 0.99 K/UL
MONOCYTES NFR BLD AUTO: 8.5 %
NEUTROPHILS # BLD AUTO: 7.87 K/UL
NEUTROPHILS NFR BLD AUTO: 67.8 %
PLATELET # BLD AUTO: 157 K/UL
POTASSIUM SERPL-SCNC: 4.8 MMOL/L
PROT SERPL-MCNC: 8 G/DL
RBC # BLD: 4.75 M/UL
RBC # FLD: 12.5 %
SODIUM SERPL-SCNC: 140 MMOL/L
WBC # FLD AUTO: 11.62 K/UL

## 2025-08-05 ENCOUNTER — APPOINTMENT (OUTPATIENT)
Dept: INTERNAL MEDICINE | Facility: CLINIC | Age: 70
End: 2025-08-05
Payer: MEDICARE

## 2025-08-05 ENCOUNTER — APPOINTMENT (OUTPATIENT)
Dept: PAIN MANAGEMENT | Facility: CLINIC | Age: 70
End: 2025-08-05

## 2025-08-05 DIAGNOSIS — Z79.891 LONG TERM (CURRENT) USE OF OPIATE ANALGESIC: ICD-10-CM

## 2025-08-05 PROCEDURE — 99213 OFFICE O/P EST LOW 20 MIN: CPT | Mod: 95

## 2025-08-05 RX ORDER — BUPRENORPHINE AND NALOXONE 8; 2 MG/1; MG/1
8-2 TABLET SUBLINGUAL
Qty: 15 | Refills: 0 | Status: ACTIVE | COMMUNITY
Start: 2025-08-05 | End: 1900-01-01

## 2025-09-02 ENCOUNTER — APPOINTMENT (OUTPATIENT)
Dept: INTERNAL MEDICINE | Facility: CLINIC | Age: 70
End: 2025-09-02
Payer: MEDICARE

## 2025-09-02 VITALS
SYSTOLIC BLOOD PRESSURE: 110 MMHG | DIASTOLIC BLOOD PRESSURE: 60 MMHG | WEIGHT: 148 LBS | BODY MASS INDEX: 21.92 KG/M2 | HEIGHT: 69 IN | HEART RATE: 76 BPM | OXYGEN SATURATION: 94 %

## 2025-09-02 DIAGNOSIS — Z79.891 LONG TERM (CURRENT) USE OF OPIATE ANALGESIC: ICD-10-CM

## 2025-09-02 PROCEDURE — 99214 OFFICE O/P EST MOD 30 MIN: CPT

## 2025-09-03 ENCOUNTER — NON-APPOINTMENT (OUTPATIENT)
Age: 70
End: 2025-09-03

## 2025-09-04 ENCOUNTER — LABORATORY RESULT (OUTPATIENT)
Age: 70
End: 2025-09-04

## 2025-09-04 ENCOUNTER — APPOINTMENT (OUTPATIENT)
Age: 70
End: 2025-09-04
Payer: MEDICARE

## 2025-09-04 DIAGNOSIS — J47.9 BRONCHIECTASIS, UNCOMPLICATED: ICD-10-CM

## 2025-09-04 DIAGNOSIS — F11.20 OPIOID DEPENDENCE, UNCOMPLICATED: ICD-10-CM

## 2025-09-04 DIAGNOSIS — K74.02 HEPATIC FIBROSIS, ADVANCED FIBROSIS: ICD-10-CM

## 2025-09-04 DIAGNOSIS — J44.9 CHRONIC OBSTRUCTIVE PULMONARY DISEASE, UNSPECIFIED: ICD-10-CM

## 2025-09-04 DIAGNOSIS — J38.00 PARALYSIS OF VOCAL CORDS AND LARYNX, UNSPECIFIED: ICD-10-CM

## 2025-09-04 DIAGNOSIS — A31.8 OTHER MYCOBACTERIAL INFECTIONS: ICD-10-CM

## 2025-09-04 PROCEDURE — 99214 OFFICE O/P EST MOD 30 MIN: CPT | Mod: 2W

## 2025-09-04 RX ORDER — AZITHROMYCIN 500 MG/1
500 TABLET, FILM COATED ORAL
Qty: 12 | Refills: 11 | Status: ACTIVE | COMMUNITY
Start: 2025-09-04 | End: 1900-01-01

## 2025-09-10 LAB — BACTERIA SPT CULT: NORMAL

## 2025-09-11 LAB — ACID FAST STN SPT: NORMAL

## 2025-09-26 PROBLEM — N17.9 ACUTE KIDNEY INJURY: Status: ACTIVE | Noted: 2025-09-26
